# Patient Record
Sex: MALE | Race: WHITE | NOT HISPANIC OR LATINO | Employment: FULL TIME | URBAN - NONMETROPOLITAN AREA
[De-identification: names, ages, dates, MRNs, and addresses within clinical notes are randomized per-mention and may not be internally consistent; named-entity substitution may affect disease eponyms.]

---

## 2021-01-25 ENCOUNTER — OFFICE VISIT (OUTPATIENT)
Dept: UROLOGY | Facility: CLINIC | Age: 66
End: 2021-01-25

## 2021-01-25 VITALS — HEIGHT: 70 IN | WEIGHT: 172 LBS | TEMPERATURE: 96.2 F | BODY MASS INDEX: 24.62 KG/M2

## 2021-01-25 DIAGNOSIS — R97.20 ELEVATED PSA: Primary | ICD-10-CM

## 2021-01-25 DIAGNOSIS — R35.0 FREQUENCY OF URINATION: ICD-10-CM

## 2021-01-25 LAB
BILIRUB BLD-MCNC: NEGATIVE MG/DL
CLARITY, POC: CLEAR
COLOR UR: YELLOW
GLUCOSE UR STRIP-MCNC: NEGATIVE MG/DL
KETONES UR QL: NEGATIVE
LEUKOCYTE EST, POC: NEGATIVE
NITRITE UR-MCNC: NEGATIVE MG/ML
PH UR: 6 [PH] (ref 5–8)
PROT UR STRIP-MCNC: NEGATIVE MG/DL
RBC # UR STRIP: NEGATIVE /UL
SP GR UR: 1.02 (ref 1–1.03)
UROBILINOGEN UR QL: NORMAL

## 2021-01-25 PROCEDURE — 99203 OFFICE O/P NEW LOW 30 MIN: CPT | Performed by: UROLOGY

## 2021-01-25 PROCEDURE — 81003 URINALYSIS AUTO W/O SCOPE: CPT | Performed by: UROLOGY

## 2021-01-25 PROCEDURE — 84153 ASSAY OF PSA TOTAL: CPT | Performed by: UROLOGY

## 2021-01-25 PROCEDURE — 84154 ASSAY OF PSA FREE: CPT | Performed by: UROLOGY

## 2021-01-25 RX ORDER — DIPHENOXYLATE HYDROCHLORIDE AND ATROPINE SULFATE 2.5; .025 MG/1; MG/1
2.5 TABLET ORAL
COMMUNITY
Start: 2020-11-02 | End: 2021-03-02

## 2021-01-25 RX ORDER — ALPRAZOLAM 0.5 MG/1
TABLET ORAL
Qty: 3 TABLET | Refills: 0 | Status: SHIPPED | OUTPATIENT
Start: 2021-01-25 | End: 2021-03-02

## 2021-01-25 RX ORDER — LEVOFLOXACIN 500 MG/1
TABLET, FILM COATED ORAL
Qty: 3 TABLET | Refills: 0 | Status: SHIPPED | OUTPATIENT
Start: 2021-01-25 | End: 2021-03-02

## 2021-01-25 NOTE — PROGRESS NOTES
Chief Complaint:          Elevated psa of 4.3    HPI:   65 y.o. male.  His previous psa's have been normal by history.  Family hx negative for cancer but his father did have a turp.  Pt has no hx of infection.  HPI      Past Medical History:        Past Medical History:   Diagnosis Date   • Diabetes mellitus (CMS/HCC)          Current Meds:     Current Outpatient Medications   Medication Sig Dispense Refill   • diphenoxylate-atropine (LOMOTIL) 2.5-0.025 MG per tablet 2.5 tablets.     • metFORMIN (GLUCOPHAGE) 500 MG tablet 500 mg.       No current facility-administered medications for this visit.         Allergies:      No Known Allergies     Past Surgical History:     History reviewed. No pertinent surgical history.      Social History:     Social History     Socioeconomic History   • Marital status:      Spouse name: Not on file   • Number of children: Not on file   • Years of education: Not on file   • Highest education level: Not on file   Tobacco Use   • Smoking status: Never Smoker   • Smokeless tobacco: Current User     Types: Chew   Substance and Sexual Activity   • Alcohol use: Never     Frequency: Never   • Drug use: Never   • Sexual activity: Defer       Family History:     Family History   Problem Relation Age of Onset   • No Known Problems Father    • No Known Problems Mother        Review of Systems:     Review of Systems   Constitutional: Negative for chills, fatigue and fever.   HENT: Negative for congestion and sinus pressure.    Respiratory: Negative for chest tightness and shortness of breath.    Cardiovascular: Negative for chest pain.   Gastrointestinal: Negative for abdominal pain, constipation, diarrhea, nausea and vomiting.   Endocrine: Negative for heat intolerance.   Genitourinary: Positive for frequency. Negative for dysuria, flank pain, hematuria and urgency.   Musculoskeletal: Negative for back pain and neck pain.   Skin: Negative for rash.   Allergic/Immunologic: Negative for food  allergies.   Neurological: Negative for dizziness and headaches.   Hematological: Bruises/bleeds easily.   Psychiatric/Behavioral: The patient is not nervous/anxious.        IPSS Questionnaire (AUA-7):  Over the past month…    1)  Incomplete Emptying  How often have you had a sensation of not emptying your bladder?  1 - Less than 1 time in 5   2)  Frequency  How often have you had to urinate less than every two hours? 2 - Less than half the time   3)  Intermittency  How often have you found you stopped and started again several times when you urinated?  0 - Not at all   4) Urgency  How often have you found it difficult to postpone urination?  1 - Less than 1 time in 5   5) Weak Stream  How often have you had a weak urinary stream?  2 - Less than half the time   6) Straining  How often have you had to push or strain to begin urination?  1 - Less than 1 time in 5   7) Nocturia  How many times did you typically get up at night to urinate?  0 - None   Total Score:  7       Quality of life due to urinary symptoms:  If you were to spend the rest of your life with your urinary condition the way it is now, how would you feel about that? 2-Mostly Satisfied   Urine Leakage (Incontinence) 0-No Leakage       I have reviewed the follow portions of the patient's history and confirmed they are accurate today:  allergies, current medications, past family history, past medical history, past social history, past surgical history, problem list and ROS  Physical Exam:     Physical Exam  Vitals signs and nursing note reviewed.   HENT:      Head: Normocephalic and atraumatic.      Right Ear: External ear normal.      Left Ear: External ear normal.      Nose: Nose normal.   Eyes:      Conjunctiva/sclera: Conjunctivae normal.      Pupils: Pupils are equal, round, and reactive to light.   Neck:      Musculoskeletal: Normal range of motion and neck supple.      Thyroid: No thyromegaly.   Cardiovascular:      Rate and Rhythm: Normal rate and  "regular rhythm.      Heart sounds: Normal heart sounds. No murmur.   Pulmonary:      Effort: Pulmonary effort is normal. No respiratory distress.      Breath sounds: Normal breath sounds. No wheezing or rales.   Chest:      Chest wall: No tenderness.   Abdominal:      General: Bowel sounds are normal. There is no distension.      Palpations: Abdomen is soft. There is no mass.      Tenderness: There is no abdominal tenderness.      Hernia: No hernia is present.   Genitourinary:     Penis: Normal.       Prostate: Normal.      Rectum: Normal.      Comments: Small nodule about pea size on the left apex.  Musculoskeletal: Normal range of motion.         General: No tenderness.   Lymphadenopathy:      Cervical: No cervical adenopathy.   Skin:     General: Skin is warm.      Findings: No rash.   Neurological:      Mental Status: He is alert and oriented to person, place, and time.      Cranial Nerves: No cranial nerve deficit.      Motor: No abnormal muscle tone.      Coordination: Coordination normal.   Psychiatric:         Behavior: Behavior normal.         Thought Content: Thought content normal.         Judgment: Judgment normal.         Temp 96.2 °F (35.7 °C)   Ht 177.8 cm (70\")   Wt 78 kg (172 lb)   BMI 24.68 kg/m²    Procedure:         Assessment:     Encounter Diagnoses   Name Primary?   • Frequency of urination    • Elevated PSA Yes       Orders Placed This Encounter   Procedures   • PSA, Total & Free   • POC Urinalysis Dipstick, Automated       Patient reports that he is not currently experiencing any symptoms of urinary incontinence.      Plan:   Pt has elevated psa with Keller nodule on left apex.  Will proceed with prostate ultrasound and biopsies but I would like to check a free and total psa today even though we are going to proceed with biopsies.       Patient's Body mass index is 24.68 kg/m². BMI is within normal parameters. No follow-up required..      Smoking Cessation Counseling:  Never a " smoker.  Patient does not currently use any tobacco products.     Counseling was given to patient for the following topics impressions as follows: elevated psa and prostate nodule. The interim medical history and current results were reviewed.  A treatment plan with follow-up was made for Elevated PSA [R97.20].   This document has been electronically signed by Tarik Arteaga MD January 25, 2021 09:05 EST

## 2021-01-26 LAB
PSA FREE MFR SERPL: 13.1 %
PSA FREE SERPL-MCNC: 0.63 NG/ML
PSA SERPL-MCNC: 4.8 NG/ML (ref 0–4)

## 2021-02-17 ENCOUNTER — TELEPHONE (OUTPATIENT)
Dept: UROLOGY | Facility: CLINIC | Age: 66
End: 2021-02-17

## 2021-02-17 NOTE — TELEPHONE ENCOUNTER
Tobin was scheduled this morning for a biopsy but we rescheduled it for Friday morning. He had already taken his antibiotic and enema. I told him if he can get here we will give him another enema and we can send another antibiotic to his pharmacy.

## 2021-02-22 ENCOUNTER — PROCEDURE VISIT (OUTPATIENT)
Dept: UROLOGY | Facility: CLINIC | Age: 66
End: 2021-02-22

## 2021-02-22 ENCOUNTER — TELEPHONE (OUTPATIENT)
Dept: UROLOGY | Facility: CLINIC | Age: 66
End: 2021-02-22

## 2021-02-22 VITALS — BODY MASS INDEX: 24.68 KG/M2 | HEIGHT: 70 IN

## 2021-02-22 DIAGNOSIS — R97.20 ELEVATED PSA: Primary | ICD-10-CM

## 2021-02-22 DIAGNOSIS — N40.2 PROSTATE NODULE: ICD-10-CM

## 2021-02-22 DIAGNOSIS — Z48.816 AFTERCARE FOLLOWING SURGERY OF THE GENITOURINARY SYSTEM: ICD-10-CM

## 2021-02-22 PROCEDURE — 96372 THER/PROPH/DIAG INJ SC/IM: CPT | Performed by: UROLOGY

## 2021-02-22 PROCEDURE — 76942 ECHO GUIDE FOR BIOPSY: CPT | Performed by: UROLOGY

## 2021-02-22 PROCEDURE — 55700 PR PROSTATE NEEDLE BIOPSY ANY APPROACH: CPT | Performed by: UROLOGY

## 2021-02-22 RX ORDER — CEFTRIAXONE 1 G/1
1 INJECTION, POWDER, FOR SOLUTION INTRAMUSCULAR; INTRAVENOUS ONCE
Status: DISCONTINUED | OUTPATIENT
Start: 2021-02-22 | End: 2021-03-02

## 2021-02-22 NOTE — PROGRESS NOTES
"Chief Complaint:          Elevated psa of 4.3   Percent free was 13    HPI:   66 y.o. male.  Pt here for prostate ultrasound and biopsies.  He had small nodule on the left apex on exam.  HPI  His previous psa's have been normal by history.  Family hx negative for cancer but his father did have a turp.  Pt has no hx of infection    Past Medical History:        Past Medical History:   Diagnosis Date   • Diabetes mellitus (CMS/HCC)          Current Meds:     Current Outpatient Medications   Medication Sig Dispense Refill   • ALPRAZolam (Xanax) 0.5 MG tablet Bring to office for procedure. Staff will instruct on dose and timing. 3 tablet 0   • diphenoxylate-atropine (LOMOTIL) 2.5-0.025 MG per tablet 2.5 tablets.     • levoFLOXacin (LEVAQUIN) 500 MG tablet Take 1 tablet by mouth daily for three days starting the day prior to procedure. 3 tablet 0   • metFORMIN (GLUCOPHAGE) 500 MG tablet 500 mg.       No current facility-administered medications for this visit.         Allergies:      No Known Allergies     Past Surgical History:     History reviewed. No pertinent surgical history.      Social History:     Social History     Socioeconomic History   • Marital status:      Spouse name: Not on file   • Number of children: Not on file   • Years of education: Not on file   • Highest education level: Not on file   Tobacco Use   • Smoking status: Never Smoker   • Smokeless tobacco: Current User     Types: Chew   Substance and Sexual Activity   • Alcohol use: Never     Frequency: Never   • Drug use: Never   • Sexual activity: Defer       Family History:     Family History   Problem Relation Age of Onset   • No Known Problems Father    • No Known Problems Mother        Review of Systems:       Physical Exam:     Physical Exam    Ht 177.8 cm (70\")   BMI 24.68 kg/m²    Procedure:     Procedure: Transrectal Ultrasound and Guided Biopsies    Position: Fetal/left lateral decubitus    Prostate Ultrasound Guided lidocaine prostate " block    Sedation: Oral xanax    Indications: elevated psa of 4.3 and subtle left apical nodule    Procedures risks and benefits and risk and alternatives were discussed with the patient. Written Consent was obtained prior to procedure.    In patients without penicillin allergies, preoperative Oral antibiotics and pre-procedure rocephin administered.    Procedure details: 8 Mhz biplanar B&K probe was placed in the rectum. Prostate was scanned from the base of the bladder to the apex. Prostate size was measured at 18 cc prosthetic height 30 mm    width 54 mm   length 41mm    Seminal vesicals: normal  Prostate median lobe normal    Patient is to return in 1 week to discuss pathology..      Assessment:     Encounter Diagnoses   Name Primary?   • Elevated PSA Yes   • Prostate nodule       This document has been electronically signed by Tarik Arteaga MD February 22, 2021 08:46 EST

## 2021-02-23 ENCOUNTER — TELEPHONE (OUTPATIENT)
Dept: GASTROENTEROLOGY | Facility: CLINIC | Age: 66
End: 2021-02-23

## 2021-03-02 ENCOUNTER — OFFICE VISIT (OUTPATIENT)
Dept: UROLOGY | Facility: CLINIC | Age: 66
End: 2021-03-02

## 2021-03-02 VITALS — BODY MASS INDEX: 24.48 KG/M2 | TEMPERATURE: 96.5 F | WEIGHT: 171 LBS | HEIGHT: 70 IN

## 2021-03-02 DIAGNOSIS — C61 PROSTATE CANCER (HCC): Primary | ICD-10-CM

## 2021-03-02 PROCEDURE — 99215 OFFICE O/P EST HI 40 MIN: CPT | Performed by: UROLOGY

## 2021-03-02 RX ORDER — ONDANSETRON 4 MG/1
1 TABLET, FILM COATED ORAL AS NEEDED
COMMUNITY
Start: 2021-01-15 | End: 2021-03-02

## 2021-03-02 NOTE — PROGRESS NOTES
Chief Complaint:          Prostate cancer    HPI:   66 y.o. male.  Pt's psa was 4.3 with a small prostate nodule on the left apex. He had one of 6 biopsies positive on the left mid prostate and he had 3 out of 6 biopsies on the right positive for higher grade 4+4 for a score of 8 then the score of 6 on the left.  His stage is T2a and douglas score of 8 with grade of 4.  HPI  Pt's prostate volume was 18 cc.    Past Medical History:        Past Medical History:   Diagnosis Date   • Diabetes mellitus (CMS/[facility])          Current Meds:     Current Outpatient Medications   Medication Sig Dispense Refill   • BISOPROLOL FUMARATE PO Take  by mouth Daily.     • metFORMIN (GLUCOPHAGE) 500 MG tablet 500 mg.       No current facility-administered medications for this visit.         Allergies:      No Known Allergies     Past Surgical History:     History reviewed. No pertinent surgical history.      Social History:     Social History     Socioeconomic History   • Marital status:      Spouse name: Not on file   • Number of children: Not on file   • Years of education: Not on file   • Highest education level: Not on file   Tobacco Use   • Smoking status: Never Smoker   • Smokeless tobacco: Current User     Types: Chew   Substance and Sexual Activity   • Alcohol use: Never     Frequency: Never   • Drug use: Never   • Sexual activity: Defer       Family History:     Family History   Problem Relation Age of Onset   • No Known Problems Father    • No Known Problems Mother        Review of Systems:     Review of Systems   Constitutional: Negative for chills and fever.   HENT: Negative for sinus pressure.    Respiratory: Negative for cough.    Cardiovascular: Negative for leg swelling.   Gastrointestinal: Negative for abdominal pain.   Neurological: Negative for headaches.   Psychiatric/Behavioral: The patient is nervous/anxious.          Physical Exam:     Physical Exam  Vitals signs and nursing note reviewed.   HENT:      Head:  "Normocephalic and atraumatic.      Right Ear: External ear normal.      Left Ear: External ear normal.      Nose: Nose normal.   Eyes:      Conjunctiva/sclera: Conjunctivae normal.      Pupils: Pupils are equal, round, and reactive to light.   Neck:      Musculoskeletal: Normal range of motion and neck supple.      Thyroid: No thyromegaly.   Cardiovascular:      Rate and Rhythm: Normal rate and regular rhythm.      Heart sounds: Normal heart sounds. No murmur.   Pulmonary:      Effort: Pulmonary effort is normal. No respiratory distress.      Breath sounds: Normal breath sounds. No wheezing or rales.   Chest:      Chest wall: No tenderness.   Abdominal:      General: Bowel sounds are normal. There is no distension.      Palpations: Abdomen is soft. There is no mass.      Tenderness: There is no abdominal tenderness.      Hernia: No hernia is present.   Genitourinary:     Penis: Normal.       Prostate: Normal.      Rectum: Normal.      Comments: Nodule left apex  Musculoskeletal: Normal range of motion.         General: No tenderness.   Lymphadenopathy:      Cervical: No cervical adenopathy.   Skin:     General: Skin is warm.      Findings: No rash.   Neurological:      Mental Status: He is alert and oriented to person, place, and time.      Cranial Nerves: No cranial nerve deficit.      Motor: No abnormal muscle tone.      Coordination: Coordination normal.   Psychiatric:         Behavior: Behavior normal.         Thought Content: Thought content normal.         Judgment: Judgment normal.         Temp 96.5 °F (35.8 °C)   Ht 177.8 cm (70\")   Wt 77.6 kg (171 lb)   BMI 24.54 kg/m²    Procedure:         Assessment:     Encounter Diagnosis   Name Primary?   • Prostate cancer (CMS/HCC) Yes       No orders of the defined types were placed in this encounter.      Patient reports that he is not currently experiencing any symptoms of urinary incontinence.      Plan:         I discussed the options of observation vs " treatment in detail  I gave him hand outs for AUA and [employer] and [facility].    Counseling was given to patient for the following topics diagnostic results including: pathology. The interim medical history and current results were reviewed.  A treatment plan with follow-up was made for Prostate cancer (CMS/HCC) [C61]. I spent 59 minutes face to face with Tobin Herman and 90 percentage was spent in counseling.       This document has been electronically signed by Tarik Arteaga MD March 2, 2021 16:29 EST

## 2021-03-23 ENCOUNTER — OFFICE VISIT (OUTPATIENT)
Dept: UROLOGY | Facility: CLINIC | Age: 66
End: 2021-03-23

## 2021-03-23 VITALS — HEIGHT: 70 IN | BODY MASS INDEX: 24.48 KG/M2 | WEIGHT: 171 LBS | TEMPERATURE: 96.9 F

## 2021-03-23 DIAGNOSIS — C61 PROSTATE CANCER (HCC): Primary | ICD-10-CM

## 2021-03-23 DIAGNOSIS — N48.6 PEYRONIE'S DISEASE: ICD-10-CM

## 2021-03-23 DIAGNOSIS — N40.0 BENIGN PROSTATIC HYPERPLASIA WITHOUT LOWER URINARY TRACT SYMPTOMS: ICD-10-CM

## 2021-03-23 PROCEDURE — 99214 OFFICE O/P EST MOD 30 MIN: CPT | Performed by: UROLOGY

## 2021-03-23 RX ORDER — TADALAFIL 5 MG/1
5 TABLET ORAL DAILY PRN
Qty: 30 TABLET | Refills: 6 | Status: SHIPPED | OUTPATIENT
Start: 2021-03-23

## 2021-03-23 NOTE — PROGRESS NOTES
Chief Complaint:          Chief Complaint   Patient presents with   • Prostate Cancer     2 week fu        HPI:   66 y.o. male returns today, his psa was 4.3 with a small prostate nodule on the left apex. He had one of 6 biopsies positive on the left mid prostate and he had 3 out of 6 biopsies on the right positive for higher grade 4+4 for a score of 8 then the score of 6 on the left.  His stage is T2a and douglas score of 8 with grade of 4.  No voiding dysfunction at all he has no other complaints or problems.  He has no burning no discharge no fevers no chills he has known Peyronie's and takes daily Cialis.    Past Medical History:        Past Medical History:   Diagnosis Date   • Diabetes mellitus (CMS/[facility])          Current Meds:     Current Outpatient Medications   Medication Sig Dispense Refill   • BISOPROLOL FUMARATE PO Take  by mouth Daily.     • metFORMIN (GLUCOPHAGE) 500 MG tablet 500 mg.       No current facility-administered medications for this visit.        Allergies:      No Known Allergies     Past Surgical History:     History reviewed. No pertinent surgical history.      Social History:     Social History     Socioeconomic History   • Marital status:      Spouse name: Not on file   • Number of children: Not on file   • Years of education: Not on file   • Highest education level: Not on file   Tobacco Use   • Smoking status: Never Smoker   • Smokeless tobacco: Current User     Types: Chew   Substance and Sexual Activity   • Alcohol use: Never   • Drug use: Never   • Sexual activity: Defer       Family History:     Family History   Problem Relation Age of Onset   • No Known Problems Father    • No Known Problems Mother        Review of Systems:     Review of Systems   Constitutional: Negative.    HENT: Negative.    Eyes: Negative.    Respiratory: Negative.    Cardiovascular: Negative.    Gastrointestinal: Negative.    Endocrine: Negative.    Musculoskeletal: Negative.    Allergic/Immunologic:  Negative.    Neurological: Negative.    Hematological: Negative.    Psychiatric/Behavioral: Negative.        Physical Exam:     Physical Exam  Vitals and nursing note reviewed.   Constitutional:       Appearance: He is well-developed.   HENT:      Head: Normocephalic and atraumatic.   Eyes:      Conjunctiva/sclera: Conjunctivae normal.      Pupils: Pupils are equal, round, and reactive to light.   Cardiovascular:      Rate and Rhythm: Normal rate and regular rhythm.      Heart sounds: Normal heart sounds.   Pulmonary:      Effort: Pulmonary effort is normal.      Breath sounds: Normal breath sounds.   Abdominal:      General: Bowel sounds are normal.      Palpations: Abdomen is soft.   Musculoskeletal:         General: Normal range of motion.      Cervical back: Normal range of motion.   Skin:     General: Skin is warm and dry.   Neurological:      Mental Status: He is alert and oriented to person, place, and time.      Deep Tendon Reflexes: Reflexes are normal and symmetric.   Psychiatric:         Behavior: Behavior normal.         Thought Content: Thought content normal.         Judgment: Judgment normal.         I have reviewed the following portions of the patient's history: allergies, current medications, past family history, past medical history, past social history, past surgical history, problem list and ROS and confirm it's accurate.      Procedure:       Assessment/Plan:   Peyronie's Disease-we discussed the pathophysiology of this at length.  I went ahead and shena a diagram showing the disease status and how it has about an 18% association with a congenital condition called Dupuytren's contracture which is very much an inherited disease but is seen in men at the average age in the 50s and is associated many times with diabetes.  However the vast majority of this disease is related to penile trauma especially in the mid 50s when testosterone levels are dropping and a rockhard erection is not an option  causing bending at the flexion point of the penis.  We discussed treatment options including the use of xiaflex, which is clostridial collagenase causing a dissolution of the plaque.  This is especially useful single bend however we also discussed penile traction devices vacuum pumps and the use of low-dose PDE-5 inhibitors have been effective. We also discussed the significant foreshortening of the penis which is a hallmark of the disease  This is a very difficult situation for him and we will initiate aggressive therapy as his wife is very interested in resuming sexual intercourse.  He also discussed the various antiquated therapies including the use of colchicine,PABA, verapamil, and vitamin E therapy.  Continue daily Cialis sent to [facility].  Prostate cancer:  He returns today status post biopsy for extensive discussion of prostate cancer.  We discussed staging, and grading of the disease.  I described with a Dave system being from a 2-10 scale with the most common low-grade pattern being a Dave 6.  I discussed the staging workup including a total body bone scan and CT scan especially with a PSA is greater than 10.  We discussed the various options at length I discussed a radical retropubic prostatectomy done in the traditional fashion and using the robotic technique.  I then discussed radiation treatment both seed therapy and external beam therapy using Gold fiduciary markers.  We talked about some of the other alternatives such as a cryosurgical ablation at high intensity focused ultrasound as being viable alternatives but not recommended at this time.  He focused on aggressive watchful waiting and explained that currently low literature it's been discovered that a lot of men can actually observe it especially with numerous other comorbidities cannot have problems from the cancer by itself.  Talked about hormonal ablation.  In the side effects of creation of insulin resistance.  Overall, the patient was  given appropriate literature is going to think about it and I'm going to revisit the topic with them after her next office visit  Had a very long discussion about prostate cancer at first he thought he wanted to have surgery but he does not really want to leave town because of his job.  He is a  I recommended some radiation treatments.  We discussed his other problems.  This is reasonable.  BPH: Discussed the pathophysiology of BPH and obstruction.  We discussed the static and dynamic effect effects of BPH as well as using 5 alpha reductase inhibitors versus alpha blockade.  We discussed the indications for transurethral surgery as well.  And/ or other therapeutic options available including all of the newer techniques.  Currently stable            Patient's Body mass index is 24.54 kg/m². BMI is within normal parameters. No follow-up required..              This document has been electronically signed by NANDA PEREZ MD March 23, 2021 07:58 EDT

## 2021-03-25 PROBLEM — N40.0 BENIGN PROSTATIC HYPERPLASIA WITHOUT LOWER URINARY TRACT SYMPTOMS: Status: ACTIVE | Noted: 2021-03-25

## 2021-03-25 PROBLEM — N48.6 PEYRONIE'S DISEASE: Status: ACTIVE | Noted: 2021-03-25

## 2021-03-25 PROBLEM — C61 PROSTATE CANCER (HCC): Status: ACTIVE | Noted: 2021-03-25

## 2021-04-26 ENCOUNTER — OFFICE VISIT (OUTPATIENT)
Dept: UROLOGY | Facility: CLINIC | Age: 66
End: 2021-04-26

## 2021-04-26 VITALS — TEMPERATURE: 98.2 F | BODY MASS INDEX: 25.05 KG/M2 | WEIGHT: 175 LBS | HEIGHT: 70 IN

## 2021-04-26 DIAGNOSIS — C61 PROSTATE CANCER (HCC): Primary | ICD-10-CM

## 2021-04-26 PROCEDURE — 99213 OFFICE O/P EST LOW 20 MIN: CPT | Performed by: UROLOGY

## 2021-04-26 NOTE — PROGRESS NOTES
Chief Complaint:          Chief Complaint   Patient presents with   • Prostate Cancer     Plan of care       HPI:   66 y.o. male 66 y.o. male returns today, his psa was 4.3 with a small prostate nodule on the left apex. He had one of 6 biopsies positive on the left mid prostate and he had 3 out of 6 biopsies on the right positive for higher grade 4+4 for a score of 8 then the score of 6 on the left.  His stage is T2a and douglas score of 8 with grade of 4.  No voiding dysfunction at all he has no other complaints or problems.  He has no burning no discharge no fevers no chills he has known Peyronie's and takes daily Cialis.      Past Medical History:        Past Medical History:   Diagnosis Date   • Benign prostatic hyperplasia without lower urinary tract symptoms 3/25/2021   • Diabetes mellitus (CMS/[facility])    • Prostate cancer (CMS/[facility]) 3/25/2021         Current Meds:     Current Outpatient Medications   Medication Sig Dispense Refill   • BISOPROLOL FUMARATE PO Take  by mouth Daily.     • metFORMIN (GLUCOPHAGE) 500 MG tablet 500 mg.     • tadalafil (Cialis) 5 MG tablet Take 1 tablet by mouth Daily As Needed for Erectile Dysfunction. Take one Daily 30 tablet 6     No current facility-administered medications for this visit.        Allergies:      No Known Allergies     Past Surgical History:     History reviewed. No pertinent surgical history.      Social History:     Social History     Socioeconomic History   • Marital status:      Spouse name: Not on file   • Number of children: Not on file   • Years of education: Not on file   • Highest education level: Not on file   Tobacco Use   • Smoking status: Never Smoker   • Smokeless tobacco: Current User     Types: Chew   Substance and Sexual Activity   • Alcohol use: Never   • Drug use: Never   • Sexual activity: Defer       Family History:     Family History   Problem Relation Age of Onset   • No Known Problems Father    • No Known Problems Mother        Review of  Systems:     Review of Systems   Constitutional: Negative.    HENT: Negative.    Eyes: Negative.    Respiratory: Negative.    Cardiovascular: Negative.    Gastrointestinal: Negative.    Endocrine: Negative.    Musculoskeletal: Negative.    Allergic/Immunologic: Negative.    Neurological: Negative.    Hematological: Negative.    Psychiatric/Behavioral: Negative.        Physical Exam:     Physical Exam  Vitals and nursing note reviewed.   Constitutional:       Appearance: He is well-developed.   HENT:      Head: Normocephalic and atraumatic.   Eyes:      Conjunctiva/sclera: Conjunctivae normal.      Pupils: Pupils are equal, round, and reactive to light.   Cardiovascular:      Rate and Rhythm: Normal rate and regular rhythm.      Heart sounds: Normal heart sounds.   Pulmonary:      Effort: Pulmonary effort is normal.      Breath sounds: Normal breath sounds.   Abdominal:      General: Bowel sounds are normal.      Palpations: Abdomen is soft.   Musculoskeletal:         General: Normal range of motion.      Cervical back: Normal range of motion.   Skin:     General: Skin is warm and dry.   Neurological:      Mental Status: He is alert and oriented to person, place, and time.      Deep Tendon Reflexes: Reflexes are normal and symmetric.   Psychiatric:         Behavior: Behavior normal.         Thought Content: Thought content normal.         Judgment: Judgment normal.         I have reviewed the following portions of the patient's history: allergies, current medications, past family history, past medical history, past social history, past surgical history, problem list and ROS and confirm it's accurate.      Procedure:       Assessment/Plan:   Prostate cancer:  He returns today status post biopsy for extensive discussion of prostate cancer.  We discussed staging, and grading of the disease.  I described with a Dave system being from a 2-10 scale with the most common low-grade pattern being a [place] 6.  I discussed  the staging workup including a total body bone scan and CT scan especially with a PSA is greater than 10.  We discussed the various options at length I discussed a radical retropubic prostatectomy done in the traditional fashion and using the robotic technique.  I then discussed radiation treatment both seed therapy and external beam therapy using Gold fiduciary markers.  We talked about some of the other alternatives such as a cryosurgical ablation at high intensity focused ultrasound as being viable alternatives but not recommended at this time.  He focused on aggressive watchful waiting and explained that currently low literature it's been discovered that a lot of men can actually observe it especially with numerous other comorbidities cannot have problems from the cancer by itself.  Talked about hormonal ablation.  In the side effects of creation of insulin resistance.  Overall, the patient was given appropriate literature is going to think about it and I'm going to revisit the topic with them after her next office visit  I recommended radiation treatment is the primary therapy.  He will have it here when the center opens up in the next several days            Patient's Body mass index is 25.11 kg/m². BMI is above normal parameters. Recommendations include: educational material.              This document has been electronically signed by NANDA PEREZ MD April 26, 2021 14:17 EDT

## 2021-05-14 ENCOUNTER — TELEPHONE (OUTPATIENT)
Dept: UROLOGY | Facility: CLINIC | Age: 66
End: 2021-05-14

## 2021-05-14 NOTE — TELEPHONE ENCOUNTER
Pt called to find out when he would be scheduled for radiation treatment.  He thought Dr. Londono had told him he would be able to do it our hospital sometime in May.  I don't see a referral has been put it for this to be scheduled.  He would like it to be done here but if it can't wait he is willing to be referred to someplace else.

## 2021-05-14 NOTE — TELEPHONE ENCOUNTER
I talked to Dr Funes and we are waiting on our radiation center to open in the hospital it should open shortly he has already talked to the radiation therapist and established a connection on that for the patient. I called the pt with no answer and no way to leave a message please tell him this is being handled and will contact him with dates and times as soon as the center opens

## 2021-06-21 ENCOUNTER — OFFICE VISIT (OUTPATIENT)
Dept: RADIATION ONCOLOGY | Facility: HOSPITAL | Age: 66
End: 2021-06-21

## 2021-06-21 ENCOUNTER — CONSULT (OUTPATIENT)
Dept: RADIATION ONCOLOGY | Facility: HOSPITAL | Age: 66
End: 2021-06-21

## 2021-06-21 VITALS
WEIGHT: 166.5 LBS | TEMPERATURE: 97.3 F | BODY MASS INDEX: 23.84 KG/M2 | DIASTOLIC BLOOD PRESSURE: 87 MMHG | SYSTOLIC BLOOD PRESSURE: 146 MMHG | OXYGEN SATURATION: 95 % | HEIGHT: 70 IN | HEART RATE: 70 BPM | RESPIRATION RATE: 16 BRPM

## 2021-06-21 DIAGNOSIS — C61 PROSTATE CANCER (HCC): Primary | ICD-10-CM

## 2021-06-21 PROCEDURE — G0463 HOSPITAL OUTPT CLINIC VISIT: HCPCS

## 2021-06-21 PROCEDURE — 99204 OFFICE O/P NEW MOD 45 MIN: CPT | Performed by: RADIOLOGY

## 2021-06-21 NOTE — PROGRESS NOTES
Chief Complaint  Prostate Cancer    Subjective          Tobin Herman presents to [facility] RADIATION ONCOLOGY  History of Present Illness     66 y.o. male with high risk prostate cancer with PSA 4.3 with a small prostate nodule on the left apex. He had one of 6 biopsies positive on the left mid prostate and he had 3 out of 6 biopsies on the right positive for higher grade 4+4 for a score of 8 then the score of 6 on the left.  His stage is T2a and douglas score of 8 with grade of 4.        No voiding dysfunction at all he has no other complaints or problems.  He has no burning no discharge no fevers no chills he has known Peyronie's and takes daily Cialis.    Past Medical History:         Medical History        Past Medical History:   Diagnosis Date   • Diabetes mellitus (CMS/[facility])                 Current Meds:      Current Medications          Current Outpatient Medications   Medication Sig Dispense Refill   • ALPRAZolam (Xanax) 0.5 MG tablet Bring to office for procedure. Staff will instruct on dose and timing. 3 tablet 0   • diphenoxylate-atropine (LOMOTIL) 2.5-0.025 MG per tablet 2.5 tablets.       • levoFLOXacin (LEVAQUIN) 500 MG tablet Take 1 tablet by mouth daily for three days starting the day prior to procedure. 3 tablet 0   • metFORMIN (GLUCOPHAGE) 500 MG tablet 500 mg.          No current facility-administered medications for this visit.             Allergies:       No Known Allergies     Past Surgical History:      Surgical History   History reviewed. No pertinent surgical history.           Social History:      Social History   Social History            Socioeconomic History   • Marital status:        Spouse name: Not on file   • Number of children: Not on file   • Years of education: Not on file   • Highest education level: Not on file   Tobacco Use   • Smoking status: Never Smoker   • Smokeless tobacco: Current User       Types: Chew   Substance and Sexual Activity   • Alcohol use:  "Never       Frequency: Never   • Drug use: Never   • Sexual activity: Defer            Family History:            Family History   Problem Relation Age of Onset   • No Known Problems Father     • No Known Problems Mother          Objective   Vital Signs:   /87   Pulse 70   Temp 97.3 °F (36.3 °C) (Temporal)   Resp 16   Ht 177.8 cm (70\")   Wt 75.5 kg (166 lb 8 oz)   SpO2 95%   BMI 23.89 kg/m²     Physical Exam   Result Review :            Pathology from February 23, 2021 showed prostate adenocarcinoma Douglas 84+4 involving 30% of tissue core in the right apex Douglas 884+4 involving 10% of tissue core in the right lateral apex.  Right lateral mid [place] 7, left mid [place] 6.         Assessment and Plan      66 y.o. male with high risk prostate cancer with PSA 4.3 with a small prostate nodule on the left apex. He had one of 6 biopsies positive on the left mid prostate and he had 3 out of 6 biopsies on the right positive for higher grade 4+4 for a score of 8 then the score of 6 on the left.  His stage is T2a and douglas score of 8 with grade of 4.       In summary, this is a generally in excellent health, per the NCCN risk classification he has high risk disease .    Management options, including active surveillance, surgery, and radiation therapy, were   discussed in detail.    In the case of surgery, robot-assisted radical prostatectomy would be required. He would be a candidate for bilateral completenerve sparing. I reviewed the risks and benefits, including bleeding, infection, injury to the  urethra, bladder and surrounding visceral and vascular structures of the abdomen and pelvis,  general risks of surgery and anesthesia, including positioning injuries and possible need to convert to a different operation, and longer-term risks including bladder neck contracture, urinary incontinence, erectile dysfunction, dry ejaculate, infertility, penile shortening, and persistent or recurrent tumor requiring " additional therapy.    In the case of radiation therapy, he would be a candidate for external beam therapy with androgen deprivation therapy together with radiation. I reviewed risks including urinary and/or bowel urgency and frequency, hematuria, hematochezia, diarrhea, fatigue, erectile dysfunction, urethral stricture, rectourinary fistula, and secondary pelvic malignancy. If androgen deprivation were used it would entail additional side effects.     Ultimately, the patient, after a careful discussion of his preferences for relevant outcomes and   concern about adverse effects, the patient has decided on   external-beam radiation.    We will offer a course of external beam radiation therapy with IMRT and IGRT for this patient.  With a 5040 cGy in 180 cGy per fraction to his pelvic lymph nodes, prostate and seminal vesicles, and total dose of 8100 cGy/ 180 cGy per fraction in 9 weeks to his prostate and seminal vesicles.  I have I have explained the risk involving radiation therapy to the patient.    I will coordinate with Dr. Londono for his hormonal therapy care.      Follow Up   Patient was given instructions and counseling regarding his condition or for health maintenance advice. Please see specific information pulled into the AVS if appropriate.

## 2021-06-22 ENCOUNTER — APPOINTMENT (OUTPATIENT)
Dept: RADIATION ONCOLOGY | Facility: HOSPITAL | Age: 66
End: 2021-06-22

## 2021-06-22 PROCEDURE — 77334 RADIATION TREATMENT AID(S): CPT

## 2021-06-23 ENCOUNTER — APPOINTMENT (OUTPATIENT)
Dept: RADIATION ONCOLOGY | Facility: HOSPITAL | Age: 66
End: 2021-06-23

## 2021-06-30 PROCEDURE — 77301 RADIOTHERAPY DOSE PLAN IMRT: CPT

## 2021-06-30 PROCEDURE — 77300 RADIATION THERAPY DOSE PLAN: CPT

## 2021-06-30 PROCEDURE — 77338 DESIGN MLC DEVICE FOR IMRT: CPT

## 2021-07-06 ENCOUNTER — OFFICE VISIT (OUTPATIENT)
Dept: RADIATION ONCOLOGY | Facility: HOSPITAL | Age: 66
End: 2021-07-06

## 2021-07-06 ENCOUNTER — RADIATION ONCOLOGY WEEKLY ASSESSMENT (OUTPATIENT)
Dept: RADIATION ONCOLOGY | Facility: HOSPITAL | Age: 66
End: 2021-07-06

## 2021-07-06 VITALS — WEIGHT: 168.2 LBS | BODY MASS INDEX: 24.13 KG/M2

## 2021-07-06 DIAGNOSIS — C61 PROSTATE CANCER (HCC): Primary | ICD-10-CM

## 2021-07-06 PROCEDURE — 77385: CPT

## 2021-07-06 NOTE — PROGRESS NOTES
OTV Note      Patient Name: Tobin Herman  : 1955   MRN: [id number]     Diagnosis:  No chief complaint on file.    Staging: No matching staging information was found for the patient.     This patient was seen today for an on treatment visit. The patient is receiving radiation treatment to the prostate and SV. The patient has received XRT Dose (cGy): 180 cGy in 1 ractions out of a planned dose of 8100 cGy in 45* fractions.       Subjective      Review of Systems:   Review of Systems   All other systems reviewed and are negative.    Review of Systems   All other systems reviewed and are negative.      Medications:     Current Outpatient Medications:   •  BISOPROLOL FUMARATE PO, Take  by mouth Daily., Disp: , Rfl:   •  metFORMIN (GLUCOPHAGE) 500 MG tablet, 500 mg., Disp: , Rfl:   •  phenazopyridine (PYRIDIUM) 100 MG tablet, Take 1 tablet by mouth 3 (Three) Times a Day As Needed for Bladder Spasms., Disp: 90 tablet, Rfl: 1  •  tadalafil (Cialis) 5 MG tablet, Take 1 tablet by mouth Daily As Needed for Erectile Dysfunction. Take one Daily, Disp: 30 tablet, Rfl: 6  •  tamsulosin (FLOMAX) 0.4 MG capsule 24 hr capsule, Take 1 capsule by mouth Daily., Disp: 30 capsule, Rfl: 2    Allergies:   No Known Allergies      Objective       Vital Signs:   Vitals:    21 1000   Weight: 76.3 kg (168 lb 3.2 oz)     Body mass index is 24.13 kg/m².     Current Total XRT Dose (cGY): XRT Dose (cGy): 180    Plan      Plan:   Patient was seen today for an on treatment visit. Patient is receiving radiation therapy to the prostate and SV. Patient is stable and tolerating radiation therapy well with minimal side effects. Continue with radiation therapy.       I have reviewed treatment setup notes, checked and approved the daily guidance images. I reviewed dose delivery, treatment parameters and deemed them appropriate. We plan to continue radiation therapy as prescribed.       IJoel MD, personally performed the services  described in this documentation as scribed by the above named individual in my presence, and it is both accurate and complete.      Patient tolerating well. No complaints at this time.     Electronically signed by Joel Davila MD, 09/08/21, 2:50 PM EDT.

## 2021-07-07 ENCOUNTER — PRIOR AUTHORIZATION (OUTPATIENT)
Dept: UROLOGY | Facility: CLINIC | Age: 66
End: 2021-07-07

## 2021-07-07 PROCEDURE — 77385: CPT

## 2021-07-07 NOTE — TELEPHONE ENCOUNTER
Kat RIVAS - [phone]    Member ID: [id number]  [place]: 8  Type & Stage: adenocarcinoma of prostate   ECO  Initial or Continuation: Initial    Approval Dates: 2021 til   Authorization: [id number]      Faxed clinical documentation to [phone]

## 2021-07-08 PROCEDURE — 77336 RADIATION PHYSICS CONSULT: CPT | Performed by: RADIOLOGY

## 2021-07-08 PROCEDURE — 77385: CPT | Performed by: RADIOLOGY

## 2021-07-09 ENCOUNTER — OFFICE VISIT (OUTPATIENT)
Dept: UROLOGY | Facility: CLINIC | Age: 66
End: 2021-07-09

## 2021-07-09 VITALS — HEIGHT: 70 IN | WEIGHT: 166.45 LBS | BODY MASS INDEX: 23.83 KG/M2

## 2021-07-09 DIAGNOSIS — C61 PROSTATE CANCER (HCC): Primary | ICD-10-CM

## 2021-07-09 PROCEDURE — 96402 CHEMO HORMON ANTINEOPL SQ/IM: CPT | Performed by: UROLOGY

## 2021-07-09 PROCEDURE — 77385: CPT | Performed by: RADIOLOGY

## 2021-07-09 PROCEDURE — 99213 OFFICE O/P EST LOW 20 MIN: CPT | Performed by: UROLOGY

## 2021-07-09 NOTE — PROGRESS NOTES
Chief Complaint:          Chief Complaint   Patient presents with   • Prostate Cancer       HPI:   66 y.o. male currently in the midst of radiation he is here for Lupron treatment discussed risks and benefits including vasomotor instability etc. given a 6-month Lupron I will see him back in 6 months      Past Medical History:        Past Medical History:   Diagnosis Date   • Benign prostatic hyperplasia without lower urinary tract symptoms 3/25/2021   • Diabetes mellitus (CMS/[facility])    • Prostate cancer (CMS/[facility]) 3/25/2021         Current Meds:     Current Outpatient Medications   Medication Sig Dispense Refill   • BISOPROLOL FUMARATE PO Take  by mouth Daily.     • metFORMIN (GLUCOPHAGE) 500 MG tablet 500 mg.     • tadalafil (Cialis) 5 MG tablet Take 1 tablet by mouth Daily As Needed for Erectile Dysfunction. Take one Daily 30 tablet 6     No current facility-administered medications for this visit.        Allergies:      No Known Allergies     Past Surgical History:     Past Surgical History:   Procedure Laterality Date   • PROSTATE SURGERY           Social History:     Social History     Socioeconomic History   • Marital status:      Spouse name: Not on file   • Number of children: Not on file   • Years of education: Not on file   • Highest education level: Not on file   Tobacco Use   • Smoking status: Never Smoker   • Smokeless tobacco: Current User     Types: Chew   Substance and Sexual Activity   • Alcohol use: Never   • Drug use: Never   • Sexual activity: Defer       Family History:     Family History   Problem Relation Age of Onset   • No Known Problems Father    • Diabetes Mother        Review of Systems:     Review of Systems   Constitutional: Negative.    HENT: Negative.    Eyes: Negative.    Respiratory: Negative.    Cardiovascular: Negative.    Gastrointestinal: Negative.    Endocrine: Negative.    Musculoskeletal: Negative.    Allergic/Immunologic: Negative.    Neurological: Negative.     Hematological: Negative.    Psychiatric/Behavioral: Negative.        Physical Exam:     Physical Exam  Vitals and nursing note reviewed.   Constitutional:       Appearance: He is well-developed.   HENT:      Head: Normocephalic and atraumatic.   Eyes:      Conjunctiva/sclera: Conjunctivae normal.      Pupils: Pupils are equal, round, and reactive to light.   Cardiovascular:      Rate and Rhythm: Normal rate and regular rhythm.      Heart sounds: Normal heart sounds.   Pulmonary:      Effort: Pulmonary effort is normal.      Breath sounds: Normal breath sounds.   Abdominal:      General: Bowel sounds are normal.      Palpations: Abdomen is soft.   Musculoskeletal:         General: Normal range of motion.      Cervical back: Normal range of motion.   Skin:     General: Skin is warm and dry.   Neurological:      Mental Status: He is alert and oriented to person, place, and time.      Deep Tendon Reflexes: Reflexes are normal and symmetric.   Psychiatric:         Behavior: Behavior normal.         Thought Content: Thought content normal.         Judgment: Judgment normal.         I have reviewed the following portions of the patient's history: allergies, current medications, past family history, past medical history, past social history, past surgical history, problem list and ROS and confirm it's accurate.      Procedure:       Assessment/Plan:   Prostate cancer:  He returns today status post biopsy for extensive discussion of prostate cancer.  We discussed staging, and grading of the disease.  I described with a Dave system being from a 2-10 scale with the most common low-grade pattern being a [place] 6.  I discussed the staging workup including a total body bone scan and CT scan especially with a PSA is greater than 10.  We discussed the various options at length I discussed a radical retropubic prostatectomy done in the traditional fashion and using the robotic technique.  I then discussed radiation treatment  both seed therapy and external beam therapy using Gold fiduciary markers.  We talked about some of the other alternatives such as a cryosurgical ablation at high intensity focused ultrasound as being viable alternatives but not recommended at this time.  He focused on aggressive watchful waiting and explained that currently low literature it's been discovered that a lot of men can actually observe it especially with numerous other comorbidities cannot have problems from the cancer by itself.  Talked about hormonal ablation.  In the side effects of creation of insulin resistance.  Overall, the patient was given appropriate literature is going to think about it and I'm going to revisit the topic with them after her next office visit  Given a 6-month Lupron injection as an adjunct to his ongoing radiation therapy protocol I will see him back in 6 months.        Patient's Body mass index is 23.88 kg/m². indicating that he is within normal range (BMI 18.5-24.9). No BMI management plan needed..              This document has been electronically signed by NANDA PEREZ MD July 9, 2021 14:10 EDT

## 2021-07-12 PROCEDURE — 77385: CPT | Performed by: RADIOLOGY

## 2021-07-13 ENCOUNTER — RADIATION ONCOLOGY WEEKLY ASSESSMENT (OUTPATIENT)
Dept: RADIATION ONCOLOGY | Facility: HOSPITAL | Age: 66
End: 2021-07-13

## 2021-07-13 VITALS — BODY MASS INDEX: 24.22 KG/M2 | WEIGHT: 168.8 LBS

## 2021-07-13 DIAGNOSIS — C61 PROSTATE CANCER (HCC): Primary | ICD-10-CM

## 2021-07-13 PROCEDURE — 77385: CPT | Performed by: RADIOLOGY

## 2021-07-14 PROCEDURE — 77385: CPT | Performed by: RADIOLOGY

## 2021-07-15 PROCEDURE — 77385: CPT | Performed by: RADIOLOGY

## 2021-07-15 PROCEDURE — 77336 RADIATION PHYSICS CONSULT: CPT | Performed by: RADIOLOGY

## 2021-07-16 PROCEDURE — 77385: CPT | Performed by: RADIOLOGY

## 2021-07-19 PROCEDURE — 77385: CPT | Performed by: RADIOLOGY

## 2021-07-20 ENCOUNTER — RADIATION ONCOLOGY WEEKLY ASSESSMENT (OUTPATIENT)
Dept: RADIATION ONCOLOGY | Facility: HOSPITAL | Age: 66
End: 2021-07-20

## 2021-07-20 VITALS — WEIGHT: 168.8 LBS | BODY MASS INDEX: 24.22 KG/M2

## 2021-07-20 DIAGNOSIS — C61 PROSTATE CANCER (HCC): Primary | ICD-10-CM

## 2021-07-20 PROCEDURE — 77385: CPT | Performed by: RADIOLOGY

## 2021-07-20 RX ORDER — PHENAZOPYRIDINE HYDROCHLORIDE 100 MG/1
100 TABLET, FILM COATED ORAL 3 TIMES DAILY PRN
Qty: 90 TABLET | Refills: 1 | Status: SHIPPED | OUTPATIENT
Start: 2021-07-20

## 2021-07-20 NOTE — PROGRESS NOTES
Patient tolerating well. Patient complains of burning with urination. Dr. Davila gave patient perscription for pyridium.

## 2021-07-21 PROCEDURE — 77385: CPT | Performed by: RADIOLOGY

## 2021-07-22 PROCEDURE — 77385: CPT | Performed by: RADIOLOGY

## 2021-07-23 PROCEDURE — 77385: CPT | Performed by: RADIOLOGY

## 2021-07-24 PROCEDURE — 77336 RADIATION PHYSICS CONSULT: CPT | Performed by: RADIOLOGY

## 2021-07-26 PROCEDURE — 77385: CPT | Performed by: RADIOLOGY

## 2021-07-27 ENCOUNTER — RADIATION ONCOLOGY WEEKLY ASSESSMENT (OUTPATIENT)
Dept: RADIATION ONCOLOGY | Facility: HOSPITAL | Age: 66
End: 2021-07-27

## 2021-07-27 VITALS — WEIGHT: 166.2 LBS | BODY MASS INDEX: 23.85 KG/M2

## 2021-07-27 DIAGNOSIS — C61 PROSTATE CANCER (HCC): Primary | ICD-10-CM

## 2021-07-27 PROCEDURE — FACE2FACE

## 2021-07-27 PROCEDURE — 77385: CPT | Performed by: RADIOLOGY

## 2021-07-27 PROCEDURE — 77427 RADIATION TX MANAGEMENT X5: CPT | Performed by: RADIOLOGY

## 2021-07-27 NOTE — PROGRESS NOTES
I have seen and examined the patient with my nurse practitioner with my nurse.  Patient is stable tolerating radiation therapy well minimal side effects, will continue radiation therapy.  Continue using pyridium. At 2880cGy.

## 2021-07-28 PROCEDURE — 77385: CPT | Performed by: RADIOLOGY

## 2021-07-29 PROCEDURE — 77385: CPT | Performed by: RADIOLOGY

## 2021-07-30 PROCEDURE — 77336 RADIATION PHYSICS CONSULT: CPT | Performed by: RADIOLOGY

## 2021-07-30 PROCEDURE — 77385: CPT | Performed by: RADIOLOGY

## 2021-08-02 ENCOUNTER — OFFICE VISIT (OUTPATIENT)
Dept: RADIATION ONCOLOGY | Facility: HOSPITAL | Age: 66
End: 2021-08-02

## 2021-08-02 PROCEDURE — 77385: CPT | Performed by: RADIOLOGY

## 2021-08-02 PROCEDURE — 77014: CPT | Performed by: RADIOLOGY

## 2021-08-03 ENCOUNTER — RADIATION ONCOLOGY WEEKLY ASSESSMENT (OUTPATIENT)
Dept: RADIATION ONCOLOGY | Facility: HOSPITAL | Age: 66
End: 2021-08-03

## 2021-08-03 VITALS
RESPIRATION RATE: 16 BRPM | OXYGEN SATURATION: 98 % | SYSTOLIC BLOOD PRESSURE: 155 MMHG | DIASTOLIC BLOOD PRESSURE: 85 MMHG | HEART RATE: 73 BPM | BODY MASS INDEX: 23.7 KG/M2 | TEMPERATURE: 97.7 F | WEIGHT: 165.2 LBS

## 2021-08-03 DIAGNOSIS — C61 PROSTATE CANCER (HCC): Primary | ICD-10-CM

## 2021-08-03 PROCEDURE — 77300 RADIATION THERAPY DOSE PLAN: CPT | Performed by: RADIOLOGY

## 2021-08-03 PROCEDURE — 77014: CPT | Performed by: RADIOLOGY

## 2021-08-03 PROCEDURE — 77427 RADIATION TX MANAGEMENT X5: CPT | Performed by: RADIOLOGY

## 2021-08-03 PROCEDURE — 77338 DESIGN MLC DEVICE FOR IMRT: CPT | Performed by: RADIOLOGY

## 2021-08-03 PROCEDURE — 77385: CPT | Performed by: RADIOLOGY

## 2021-08-03 PROCEDURE — 99214 OFFICE O/P EST MOD 30 MIN: CPT | Performed by: RADIOLOGY

## 2021-08-03 RX ORDER — TAMSULOSIN HYDROCHLORIDE 0.4 MG/1
1 CAPSULE ORAL DAILY
Qty: 30 CAPSULE | Refills: 2 | Status: SHIPPED | OUTPATIENT
Start: 2021-08-03 | End: 2021-10-13 | Stop reason: SDUPTHER

## 2021-08-03 NOTE — PROGRESS NOTES
OTV Note      Patient Name: Tobin Herman  : 1955   MRN: [id number]   20  Diagnosis:  Prostate Cancer     I have seen and examined the patient. The patient is receiving radiation treatment to the prostate. The patient has received 3780 cGy in 20 fractions out of a planned dose of 4500 cGy in 25 fractions. He will receive an additional 20 fraction boost with a total dose of 3600 cGy.       Objective     Physical Exam:  Physical Exam  Constitutional:       General: He is not in acute distress.     Appearance: Normal appearance. He is normal weight. He is not ill-appearing.   HENT:      Head: Normocephalic.      Nose: Nose normal.      Mouth/Throat:      Mouth: Mucous membranes are moist.      Pharynx: Oropharynx is clear.   Eyes:      Extraocular Movements: Extraocular movements intact.      Conjunctiva/sclera: Conjunctivae normal.      Pupils: Pupils are equal, round, and reactive to light.   Pulmonary:      Effort: Pulmonary effort is normal. No respiratory distress.   Abdominal:      General: Abdomen is flat. There is no distension.      Palpations: Abdomen is soft. There is no mass.   Genitourinary:     Penis: Normal.    Musculoskeletal:         General: No swelling or tenderness. Normal range of motion.      Cervical back: Normal range of motion.   Skin:     General: Skin is warm and dry.      Capillary Refill: Capillary refill takes less than 2 seconds.      Findings: No erythema or rash.   Neurological:      General: No focal deficit present.      Mental Status: He is alert and oriented to person, place, and time. Mental status is at baseline.   Psychiatric:         Mood and Affect: Mood normal.         Behavior: Behavior normal.         Thought Content: Thought content normal.         Judgment: Judgment normal.         Vital Signs:   Vitals:    21 0800   BP: 155/85   BP Location: Right arm   Patient Position: Sitting   Pulse: 73   Resp: 16   Temp: 97.7 °F (36.5 °C)   TempSrc: Temporal   SpO2:  98%   Weight: 74.9 kg (165 lb 3.2 oz)     Body mass index is 23.7 kg/m².     Current Total XRT Dose (cGY): XRT Dose (cGy): 3780    Plan      Plan: Patient is stable and tolerating radiation therapy well with minimal side effects. He is complaining of slight burning at the beginning of urination and difficulty maintaining a constant stream during urination. Pt is not on Flomax at this time, this is started today. Pt to receive UA with culture tomorrow after radiation treatment to assess for a UTI. Pt states he has been taking his Pyridium, but he states that he does not think that this is helping. He does state that he has had intermittent diarrhea that has since resolved, but he is aware that this is a side effect of the radiation treatment. No skin irritation noted. Continue with radiation therapy.     I have reviewed treatment setup notes, checked and approved the daily guidance images. I reviewed dose delivery, treatment parameters and deemed them appropriate. We plan to continue radiation therapy as prescribed.     Scribed for Dr. Joel Davila MD by MARCIE Muir    I have supervised the OTV with Felipa and agree with the assessment and plans.    I have reviewed treatment setup notes, checked and approved the daily guidance images. I reviewed dose delivery, treatment parameters and deemed them appropriate. We plan to continue radiation therapy as prescribed.     I, Joel Davila MD, personally performed the services described in this documentation as scribed by the above named individual in my presence, and it is both accurate and complete.    Electronically signed by Joel Davila MD, 09/08/21, 10:50 AM EDT.

## 2021-08-04 DIAGNOSIS — C61 PROSTATE CANCER (HCC): ICD-10-CM

## 2021-08-04 LAB
BILIRUB UR QL STRIP: NEGATIVE
CLARITY UR: CLEAR
COLOR UR: YELLOW
GLUCOSE UR STRIP-MCNC: NEGATIVE MG/DL
HGB UR QL STRIP.AUTO: NEGATIVE
KETONES UR QL STRIP: NEGATIVE
LEUKOCYTE ESTERASE UR QL STRIP.AUTO: NEGATIVE
NITRITE UR QL STRIP: NEGATIVE
PH UR STRIP.AUTO: 6.5 [PH] (ref 5–8)
PROT UR QL STRIP: NEGATIVE
SP GR UR STRIP: 1.01 (ref 1–1.03)
UROBILINOGEN UR QL STRIP: NORMAL

## 2021-08-04 PROCEDURE — 77014: CPT | Performed by: RADIOLOGY

## 2021-08-04 PROCEDURE — 77385: CPT | Performed by: RADIOLOGY

## 2021-08-04 PROCEDURE — 81003 URINALYSIS AUTO W/O SCOPE: CPT

## 2021-08-05 PROCEDURE — 77385: CPT | Performed by: RADIOLOGY

## 2021-08-05 PROCEDURE — 77336 RADIATION PHYSICS CONSULT: CPT | Performed by: RADIOLOGY

## 2021-08-06 PROCEDURE — 77385: CPT | Performed by: RADIOLOGY

## 2021-08-09 PROCEDURE — 77385: CPT | Performed by: RADIOLOGY

## 2021-08-10 ENCOUNTER — OFFICE VISIT (OUTPATIENT)
Dept: RADIATION ONCOLOGY | Facility: HOSPITAL | Age: 66
End: 2021-08-10

## 2021-08-10 VITALS
OXYGEN SATURATION: 99 % | BODY MASS INDEX: 23.88 KG/M2 | TEMPERATURE: 96.9 F | SYSTOLIC BLOOD PRESSURE: 158 MMHG | HEART RATE: 65 BPM | DIASTOLIC BLOOD PRESSURE: 92 MMHG | RESPIRATION RATE: 16 BRPM | WEIGHT: 166.4 LBS

## 2021-08-10 DIAGNOSIS — C61 PROSTATE CANCER (HCC): Primary | ICD-10-CM

## 2021-08-10 PROCEDURE — 77385: CPT | Performed by: RADIOLOGY

## 2021-08-10 NOTE — PROGRESS NOTES
OTV Note      Patient Name: Tobin Herman  : 1955   MRN: [id number]     Diagnosis:  Prostate Cancer     I have seen and examined the patient.     Objective     Physical Exam:  Physical Exam  Vitals and nursing note reviewed.   Constitutional:       Appearance: Normal appearance.   Cardiovascular:      Rate and Rhythm: Normal rate.      Pulses: Normal pulses.   Pulmonary:      Effort: Pulmonary effort is normal.   Abdominal:      General: Abdomen is flat.      Palpations: Abdomen is soft.   Genitourinary:     Penis: Normal.       Testes: Normal.   Skin:     General: Skin is warm and dry.   Neurological:      General: No focal deficit present.      Mental Status: He is alert and oriented to person, place, and time. Mental status is at baseline.   Psychiatric:         Mood and Affect: Mood normal.         Behavior: Behavior normal.         Thought Content: Thought content normal.         Vital Signs:   Vitals:    08/10/21 0800   BP: 158/92   BP Location: Right arm   Patient Position: Sitting   Pulse: 65   Resp: 16   Temp: 96.9 °F (36.1 °C)   TempSrc: Temporal   SpO2: 99%   Weight: 75.5 kg (166 lb 6.4 oz)     Body mass index is 23.88 kg/m².     Current Total XRT Dose (cGY):  4680    Plan      Plan: Patient is stable and tolerating radiation therapy well with minimal side effects. He is complaining today of occasional diarrhea. I instructed him to obtain OTC Imodium if this problem persists. Pt states that his burning during urination has resolved, and that the Flomax has helped with maintaining a constant stream during urination. UA that was obtained at the last visit was clear, with no signs of infection. Continue with radiation therapy.       Patient was seen today in order to maintain continuity of care throughout this week. Dr. Davila out due to medical reasons and was unable to be here for the OTV visits. No billing for OTV’s will be performed for this week. For more detailed information over todays check  in visit see Rad Onc Status Check Flowsheet and note from this day.

## 2021-08-11 PROCEDURE — 77385: CPT | Performed by: RADIOLOGY

## 2021-08-12 PROCEDURE — 77385: CPT | Performed by: RADIOLOGY

## 2021-08-12 PROCEDURE — 77336 RADIATION PHYSICS CONSULT: CPT | Performed by: RADIOLOGY

## 2021-08-13 PROCEDURE — 77385: CPT | Performed by: RADIOLOGY

## 2021-08-16 ENCOUNTER — DOCUMENTATION (OUTPATIENT)
Dept: RADIATION ONCOLOGY | Facility: HOSPITAL | Age: 66
End: 2021-08-16

## 2021-08-16 PROCEDURE — 77385: CPT | Performed by: RADIOLOGY

## 2021-08-17 PROCEDURE — 77385: CPT | Performed by: RADIOLOGY

## 2021-08-18 PROCEDURE — 77385: CPT | Performed by: RADIOLOGY

## 2021-08-19 ENCOUNTER — OFFICE VISIT (OUTPATIENT)
Dept: RADIATION ONCOLOGY | Facility: HOSPITAL | Age: 66
End: 2021-08-19

## 2021-08-19 VITALS
RESPIRATION RATE: 16 BRPM | TEMPERATURE: 97.1 F | BODY MASS INDEX: 23.96 KG/M2 | SYSTOLIC BLOOD PRESSURE: 164 MMHG | WEIGHT: 167 LBS | OXYGEN SATURATION: 99 % | HEART RATE: 69 BPM | DIASTOLIC BLOOD PRESSURE: 89 MMHG

## 2021-08-19 DIAGNOSIS — C61 PROSTATE CANCER (HCC): Primary | ICD-10-CM

## 2021-08-19 PROCEDURE — 77385: CPT | Performed by: RADIOLOGY

## 2021-08-19 NOTE — PROGRESS NOTES
OTV Note      Patient Name: Tobin Herman  : 1955   MRN: [id number]     Diagnosis:  Prostate Cancer     I have seen and examined the patient. No complaints at this time.     Objective     Physical Exam:  Physical Exam  Vitals reviewed.   Constitutional:       Appearance: Normal appearance.   Cardiovascular:      Rate and Rhythm: Normal rate and regular rhythm.      Pulses: Normal pulses.   Pulmonary:      Effort: Pulmonary effort is normal.   Abdominal:      General: Abdomen is flat.      Palpations: Abdomen is soft.   Genitourinary:     Penis: Normal.       Testes: Normal.   Skin:     General: Skin is warm and dry.   Neurological:      General: No focal deficit present.      Mental Status: He is alert and oriented to person, place, and time. Mental status is at baseline.   Psychiatric:         Mood and Affect: Mood normal.         Behavior: Behavior normal.         Thought Content: Thought content normal.         Vital Signs:   Vitals:    21 0700 21 0800   BP: 164/89    BP Location: Left arm    Patient Position: Sitting    Pulse: 69    Resp: 16    Temp: 97.1 °F (36.2 °C)    TempSrc: Temporal    SpO2: 99%    Weight:  75.8 kg (167 lb)     Body mass index is 23.96 kg/m².     Current Total XRT Dose (cGY): XRT Dose (cGy): 5940    Plan      Plan: Patient is stable and tolerating radiation therapy well with minimal side effects. Pt states that his burning during urination has resolved, and that the Flomax has helped with maintaining a constant stream during urination. No further complaints. Continue with radiation therapy.       Patient was seen today with physician from [facility] over FaceTime in order to maintain continuity of care throughout this week. Dr. Davila out due to medical reasons and was unable to be here for the OTV visits. No billing for OTV’s will be performed for this week. For more detailed information over todays check in visit see Rad Onc Status Check Flowsheet and  see providers note in the flowsheet from this day.

## 2021-08-20 PROCEDURE — 77385: CPT | Performed by: RADIOLOGY

## 2021-08-21 PROCEDURE — 77336 RADIATION PHYSICS CONSULT: CPT | Performed by: RADIOLOGY

## 2021-08-23 PROCEDURE — 77014: CPT | Performed by: RADIOLOGY

## 2021-08-23 PROCEDURE — 77385: CPT | Performed by: RADIOLOGY

## 2021-08-24 ENCOUNTER — OFFICE VISIT (OUTPATIENT)
Dept: RADIATION ONCOLOGY | Facility: HOSPITAL | Age: 66
End: 2021-08-24

## 2021-08-24 VITALS
WEIGHT: 165 LBS | RESPIRATION RATE: 18 BRPM | TEMPERATURE: 97 F | SYSTOLIC BLOOD PRESSURE: 156 MMHG | OXYGEN SATURATION: 99 % | BODY MASS INDEX: 23.68 KG/M2 | DIASTOLIC BLOOD PRESSURE: 84 MMHG | HEART RATE: 66 BPM

## 2021-08-24 DIAGNOSIS — C61 PROSTATE CANCER (HCC): Primary | ICD-10-CM

## 2021-08-24 PROCEDURE — 77014: CPT | Performed by: RADIOLOGY

## 2021-08-24 PROCEDURE — 77427 RADIATION TX MANAGEMENT X5: CPT | Performed by: RADIOLOGY

## 2021-08-24 PROCEDURE — 77385: CPT | Performed by: RADIOLOGY

## 2021-08-24 NOTE — PROGRESS NOTES
Patient is stable and tolerating radiation therapy well with minimal side effects. Pt states that his burning during urination has resolved, and that the Flomax has helped with maintaining a constant stream during urination. No further complaints. Continue with radiation therapy.        OTV supervised by , agree with assessment and management.

## 2021-08-25 PROCEDURE — 77014: CPT | Performed by: RADIOLOGY

## 2021-08-25 PROCEDURE — 77385: CPT | Performed by: RADIOLOGY

## 2021-08-26 PROCEDURE — 77014: CPT | Performed by: RADIOLOGY

## 2021-08-26 PROCEDURE — 77336 RADIATION PHYSICS CONSULT: CPT | Performed by: RADIOLOGY

## 2021-08-26 PROCEDURE — 77385: CPT | Performed by: RADIOLOGY

## 2021-08-27 ENCOUNTER — APPOINTMENT (OUTPATIENT)
Dept: RADIATION ONCOLOGY | Facility: HOSPITAL | Age: 66
End: 2021-08-27

## 2021-08-27 PROCEDURE — 77014: CPT | Performed by: RADIOLOGY

## 2021-08-27 PROCEDURE — 77385: CPT | Performed by: RADIOLOGY

## 2021-08-30 PROCEDURE — 77385: CPT | Performed by: RADIOLOGY

## 2021-08-30 PROCEDURE — 77014: CPT | Performed by: RADIOLOGY

## 2021-08-31 ENCOUNTER — RADIATION ONCOLOGY WEEKLY ASSESSMENT (OUTPATIENT)
Dept: RADIATION ONCOLOGY | Facility: HOSPITAL | Age: 66
End: 2021-08-31

## 2021-08-31 VITALS
RESPIRATION RATE: 18 BRPM | WEIGHT: 165.6 LBS | OXYGEN SATURATION: 98 % | DIASTOLIC BLOOD PRESSURE: 82 MMHG | SYSTOLIC BLOOD PRESSURE: 149 MMHG | BODY MASS INDEX: 23.76 KG/M2 | HEART RATE: 65 BPM | TEMPERATURE: 97.1 F

## 2021-08-31 DIAGNOSIS — C61 PROSTATE CANCER (HCC): Primary | ICD-10-CM

## 2021-08-31 PROCEDURE — 77385: CPT | Performed by: RADIOLOGY

## 2021-08-31 PROCEDURE — 77014: CPT | Performed by: RADIOLOGY

## 2021-08-31 PROCEDURE — 77427 RADIATION TX MANAGEMENT X5: CPT | Performed by: RADIOLOGY

## 2021-08-31 NOTE — PROGRESS NOTES
Status Check Note     Patient Name: Tobin Herman  : 1955   MRN: [id number]     Requesting Physician: No ref. provider found    Chief Complaint:  No chief complaint on file.    Pathology: * Cannot find OR log *   Staging: No matching staging information was found for the patient.     History of Present Illness: Tobin Herman is a pleasant 66 y.o. male who is here today for consultation regarding   Prostate cancer  Subjective      Review of Systems:   Review of Systems  Review of Systems - Oncology    I have reviewed and confirmed the accuracy of the ROS as documented by the MA/LPN/RN Joel Davila MD     Past Oncology History:   Oncology/Hematology History    No history exists.        Past Radiation History:  No previous exposures to ionizing radiation therapy and there are not relative contraindications including connective tissue disorder.     Past Medical History:   Past Medical History:   Diagnosis Date   • Benign prostatic hyperplasia without lower urinary tract symptoms 3/25/2021   • Diabetes mellitus (CMS/HCC)    • Prostate cancer (CMS/HCC) 3/25/2021       Past Surgical History:   Past Surgical History:   Procedure Laterality Date   • PROSTATE SURGERY         Family History:   Family History   Problem Relation Age of Onset   • No Known Problems Father    • Diabetes Mother        Social History:   Social History     Socioeconomic History   • Marital status:      Spouse name: Not on file   • Number of children: Not on file   • Years of education: Not on file   • Highest education level: Not on file   Tobacco Use   • Smoking status: Never Smoker   • Smokeless tobacco: Current User     Types: Chew   Substance and Sexual Activity   • Alcohol use: Never   • Drug use: Never   • Sexual activity: Defer       Medications:     Current Outpatient Medications:   •  BISOPROLOL FUMARATE PO, Take  by mouth Daily., Disp: , Rfl:   •  metFORMIN (GLUCOPHAGE) 500 MG tablet, 500 mg., Disp: , Rfl:   •   phenazopyridine (PYRIDIUM) 100 MG tablet, Take 1 tablet by mouth 3 (Three) Times a Day As Needed for Bladder Spasms., Disp: 90 tablet, Rfl: 1  •  tadalafil (Cialis) 5 MG tablet, Take 1 tablet by mouth Daily As Needed for Erectile Dysfunction. Take one Daily, Disp: 30 tablet, Rfl: 6  •  tamsulosin (FLOMAX) 0.4 MG capsule 24 hr capsule, Take 1 capsule by mouth Daily., Disp: 30 capsule, Rfl: 2    Allergies:   No Known Allergies    KPS:100%           Objective     Physical Exam:  Physical Exam   Abd Soft NT ND    Vital Signs: There were no vitals filed for this visit.  There is no height or weight on file to calculate BMI.       Assessment / Plan      Assessment/Plan:   He is seen today for an OTV he is receiving radiationt therapy to the prostate.   16/20 to SV  Total of 4500cGy+2340cGy/8100cGy.    I have reviewed treatment setup notes, checked and approved daily IGRT, I reviewed dose delivery, treatment parameters and deemed them appropriate.  Plan to continue XRT.    Joel Davila MD  [facility] [place]

## 2021-09-01 ENCOUNTER — OFFICE VISIT (OUTPATIENT)
Dept: RADIATION ONCOLOGY | Facility: HOSPITAL | Age: 66
End: 2021-09-01

## 2021-09-01 PROCEDURE — 77385: CPT | Performed by: RADIOLOGY

## 2021-09-02 PROCEDURE — 77385: CPT | Performed by: RADIOLOGY

## 2021-09-02 PROCEDURE — 77336 RADIATION PHYSICS CONSULT: CPT | Performed by: RADIOLOGY

## 2021-09-03 PROCEDURE — 77385: CPT

## 2021-09-07 ENCOUNTER — RADIATION ONCOLOGY WEEKLY ASSESSMENT (OUTPATIENT)
Dept: RADIATION ONCOLOGY | Facility: HOSPITAL | Age: 66
End: 2021-09-07

## 2021-09-07 VITALS
OXYGEN SATURATION: 99 % | DIASTOLIC BLOOD PRESSURE: 93 MMHG | SYSTOLIC BLOOD PRESSURE: 164 MMHG | RESPIRATION RATE: 18 BRPM | BODY MASS INDEX: 24.08 KG/M2 | HEART RATE: 67 BPM | TEMPERATURE: 96.9 F | WEIGHT: 167.8 LBS

## 2021-09-07 DIAGNOSIS — C61 PROSTATE CANCER (HCC): Primary | ICD-10-CM

## 2021-09-07 PROCEDURE — 77385: CPT | Performed by: RADIOLOGY

## 2021-09-07 PROCEDURE — 77427 RADIATION TX MANAGEMENT X5: CPT | Performed by: RADIOLOGY

## 2021-09-07 NOTE — PROGRESS NOTES
OTV Note      Patient Name: Tobin Herman  : 1955   MRN: [id number]     Diagnosis:  Prostate Cancer  Staging:  T2a and douglas score of 8     This patient was seen today for an on treatment visit. The patient is receiving radiation treatment to the prostate and SV. The patient has received  8100 cGy in 35 fractions out of a planned dose of 8100 cGy in 35 fractions. Today was his last treatment.       Subjective      Review of Systems:   Review of Systems   Constitutional: Negative.    HENT: Negative.    Eyes: Negative.    Respiratory: Negative.    Cardiovascular: Negative.    Gastrointestinal: Negative.    Endocrine: Negative.    Genitourinary: Negative.  Negative for difficulty urinating, discharge, dyspareunia, dysuria, frequency, hematuria, nocturia, pelvic pain, penile swelling, urgency and urinary incontinence.   Musculoskeletal: Negative.    Skin: Negative.  Negative for itching, rash and bruise.   Neurological: Negative.    Hematological: Negative.    Psychiatric/Behavioral: Negative.      Review of Systems   Constitutional: Negative.    HENT:  Negative.    Eyes: Negative.    Respiratory: Negative.    Cardiovascular: Negative.    Gastrointestinal: Negative.    Endocrine: Negative.    Genitourinary: Negative.  Negative for bladder incontinence, difficulty urinating, discharge, dyspareunia, dysuria, frequency, hematuria, nocturia, pelvic pain, penile swelling and urgency.    Musculoskeletal: Negative.    Skin: Negative.  Negative for itching, rash and wound.   Neurological: Negative.    Hematological: Negative.    Psychiatric/Behavioral: Negative.        Medications:     Current Outpatient Medications:   •  BISOPROLOL FUMARATE PO, Take  by mouth Daily., Disp: , Rfl:   •  metFORMIN (GLUCOPHAGE) 500 MG tablet, 500 mg., Disp: , Rfl:   •  phenazopyridine (PYRIDIUM) 100 MG tablet, Take 1 tablet by mouth 3 (Three) Times a Day As Needed for Bladder Spasms., Disp: 90 tablet, Rfl: 1  •  tadalafil (Cialis) 5  MG tablet, Take 1 tablet by mouth Daily As Needed for Erectile Dysfunction. Take one Daily, Disp: 30 tablet, Rfl: 6  •  tamsulosin (FLOMAX) 0.4 MG capsule 24 hr capsule, Take 1 capsule by mouth Daily., Disp: 30 capsule, Rfl: 2    Allergies:   No Known Allergies      Objective       Vital Signs:   Vitals:    09/07/21 0800   BP: 164/93   BP Location: Left arm   Patient Position: Sitting   Pulse: 67   Resp: 18   Temp: 96.9 °F (36.1 °C)   TempSrc: Temporal   SpO2: 99%   Weight: 76.1 kg (167 lb 12.8 oz)     Body mass index is 24.08 kg/m².     Current Total XRT Dose (cGY):  8100    Plan      Plan:   Patient was seen today for an on treatment visit. Patient is receiving radiation therapy to the prostate and SV. Patient is stable and has tolerated radiation therapy well with minimal side effects. Today was his last treatment. Patient will come back in 1 month for a F/U.       I have reviewed treatment setup notes, checked and approved the daily guidance images. I reviewed dose delivery, treatment parameters and deemed them appropriate. We plan to continue radiation therapy as prescribed.     I, Joel Davila MD, personally performed the services described in this documentation as scribed by the above named individual in my presence, and it is both accurate and complete.      Scribed for Dr. Joel Davila MD by MARCIE Muir 9/7/2021  08:29 EDT

## 2021-09-10 ENCOUNTER — IMMUNIZATION (OUTPATIENT)
Dept: VACCINE CLINIC | Facility: HOSPITAL | Age: 66
End: 2021-09-10

## 2021-09-10 PROCEDURE — 91300 HC SARSCOV02 VAC 30MCG/0.3ML IM: CPT | Performed by: INTERNAL MEDICINE

## 2021-09-10 PROCEDURE — 0001A: CPT | Performed by: INTERNAL MEDICINE

## 2021-09-22 ENCOUNTER — TRANSCRIBE ORDERS (OUTPATIENT)
Dept: ADMINISTRATIVE | Facility: HOSPITAL | Age: 66
End: 2021-09-22

## 2021-09-22 DIAGNOSIS — Z01.818 OTHER SPECIFIED PRE-OPERATIVE EXAMINATION: Primary | ICD-10-CM

## 2021-09-24 ENCOUNTER — LAB (OUTPATIENT)
Dept: LAB | Facility: HOSPITAL | Age: 66
End: 2021-09-24

## 2021-09-24 DIAGNOSIS — Z01.818 OTHER SPECIFIED PRE-OPERATIVE EXAMINATION: ICD-10-CM

## 2021-09-24 LAB — SARS-COV-2 RNA PNL SPEC NAA+PROBE: NOT DETECTED

## 2021-09-24 PROCEDURE — C9803 HOPD COVID-19 SPEC COLLECT: HCPCS

## 2021-09-24 PROCEDURE — U0004 COV-19 TEST NON-CDC HGH THRU: HCPCS | Performed by: OPHTHALMOLOGY

## 2021-10-01 ENCOUNTER — IMMUNIZATION (OUTPATIENT)
Dept: VACCINE CLINIC | Facility: HOSPITAL | Age: 66
End: 2021-10-01

## 2021-10-01 PROCEDURE — 0002A: CPT | Performed by: INTERNAL MEDICINE

## 2021-10-01 PROCEDURE — 91300 HC SARSCOV02 VAC 30MCG/0.3ML IM: CPT | Performed by: INTERNAL MEDICINE

## 2021-10-06 ENCOUNTER — APPOINTMENT (OUTPATIENT)
Dept: RADIATION ONCOLOGY | Facility: HOSPITAL | Age: 66
End: 2021-10-06

## 2021-10-13 ENCOUNTER — OFFICE VISIT (OUTPATIENT)
Dept: RADIATION ONCOLOGY | Facility: HOSPITAL | Age: 66
End: 2021-10-13

## 2021-10-13 ENCOUNTER — APPOINTMENT (OUTPATIENT)
Dept: RADIATION ONCOLOGY | Facility: HOSPITAL | Age: 66
End: 2021-10-13

## 2021-10-13 VITALS
OXYGEN SATURATION: 98 % | SYSTOLIC BLOOD PRESSURE: 160 MMHG | RESPIRATION RATE: 16 BRPM | DIASTOLIC BLOOD PRESSURE: 87 MMHG | HEART RATE: 86 BPM | TEMPERATURE: 97.5 F

## 2021-10-13 DIAGNOSIS — C61 PROSTATE CANCER (HCC): Primary | ICD-10-CM

## 2021-10-13 PROCEDURE — 99212 OFFICE O/P EST SF 10 MIN: CPT

## 2021-10-13 PROCEDURE — G0463 HOSPITAL OUTPT CLINIC VISIT: HCPCS

## 2021-10-13 RX ORDER — TAMSULOSIN HYDROCHLORIDE 0.4 MG/1
1 CAPSULE ORAL DAILY
Qty: 30 CAPSULE | Refills: 6 | Status: SHIPPED | OUTPATIENT
Start: 2021-10-13

## 2021-10-13 NOTE — PROGRESS NOTES
Office Follow Up Note      Patient Name: Tobin Herman  : 1955   MRN: [id number]     Requesting Physician: Jevon Londono,*    Chief Complaint:  Prostate Cancer  Staging: T2a - douglas score of 8     History of Present Illness: Tobin Herman is a pleasant 66 y.o. male who is here today for follow up after completing radiation therapy. He presented with high risk prostate cancer with PSA 4.3 with a small prostate nodule on the left apex. He had one of 6 biopsies positive on the left mid prostate and he had 3 out of 6 biopsies on the right positive for higher grade 4+4 for a score of 8 then the score of 6 on the left. His stage is T2a and douglas score of 8 with grade of 4. He has known Peyronie's and takes daily Cialis.    He is now s/p XRT and has received 1 Lupron injection. During XRT he received a total of 8100 cGy in 35 fractions. He tolerated this well with the only complication being difficulty starting urination and maintaining a stream.     Today he has no new complaints. He states that he has occasional diarrhea, and continues to have difficulty maintaining his stream during urination. However, he has not been taking his Flomax daily, only when he believes he needs it. No complaints of dysuria, frequency, pain, hematuria, or incontinence.       Subjective      Review of Systems:   Review of Systems   Constitutional: Positive for fatigue.   HENT:  Negative.    Eyes: Negative.    Respiratory: Negative.    Cardiovascular: Negative.    Gastrointestinal: Negative.  Negative for abdominal pain, diarrhea, nausea and vomiting.   Endocrine: Positive for hot flashes.   Genitourinary: Positive for difficulty urinating. Negative for bladder incontinence, dysuria, frequency, hematuria, pelvic pain and penile discharge.    Musculoskeletal: Negative.    Skin: Negative.  Negative for itching and rash.   Neurological: Negative.    Hematological: Negative.    Psychiatric/Behavioral: Negative.        I  have reviewed and confirmed the accuracy of the ROS as documented by the MA/LPN/RN MARCIE Arceo     The following portions of the patient's history were reviewed and updated as appropriate: allergies, current medications, past family history, past medical history, past social history, past surgical history and problem list.    Past Oncology History:   Oncology/Hematology History    No history exists.        KPS: 90%     Results Review:   The following data was reviewed by: MARCIE Arceo on 10/13/2021:  Common labs    Common Labsle 1/25/21   PSA 4.8 (A)   (A) Abnormal value       Comments are available for some flowsheets but are not being displayed.           PSA    PSA 1/25/21   PSA 4.8 (A)   (A) Abnormal value       Comments are available for some flowsheets but are not being displayed.           Covid Tests    Common Labsle 9/24/21   COVID19 Not Detected             Pathology     2/23/2021        Objective     Physical Exam:  Physical Exam  Constitutional:       General: He is not in acute distress.     Appearance: Normal appearance. He is normal weight. He is not ill-appearing.   HENT:      Head: Normocephalic.      Nose: Nose normal.      Mouth/Throat:      Mouth: Mucous membranes are moist.      Pharynx: Oropharynx is clear.   Eyes:      Extraocular Movements: Extraocular movements intact.      Conjunctiva/sclera: Conjunctivae normal.      Pupils: Pupils are equal, round, and reactive to light.   Cardiovascular:      Rate and Rhythm: Normal rate and regular rhythm.      Pulses: Normal pulses.      Heart sounds: Normal heart sounds.   Pulmonary:      Effort: Pulmonary effort is normal. No respiratory distress.      Breath sounds: Normal breath sounds.   Abdominal:      General: Abdomen is flat. Bowel sounds are normal. There is no distension.      Palpations: Abdomen is soft. There is no mass.   Genitourinary:     Pubic Area: No rash.    Musculoskeletal:         General: No swelling  or tenderness. Normal range of motion.      Cervical back: Normal range of motion.   Skin:     General: Skin is warm and dry.      Capillary Refill: Capillary refill takes less than 2 seconds.      Findings: No erythema or rash.   Neurological:      General: No focal deficit present.      Mental Status: He is alert and oriented to person, place, and time. Mental status is at baseline.   Psychiatric:         Mood and Affect: Mood normal.         Behavior: Behavior normal.         Thought Content: Thought content normal.         Judgment: Judgment normal.         Vital Signs:   Vitals:    10/13/21 1300   BP: 160/87   BP Location: Left arm   Patient Position: Sitting   Pulse: 86   Resp: 16   Temp: 97.5 °F (36.4 °C)   TempSrc: Oral   SpO2: 98%     There is no height or weight on file to calculate BMI.       Assessment / Plan      Assessment/Plan:   Tobin Herman is a pleasant 66 y.o. male who is here today for follow up after completing radiation therapy. He presented with high risk prostate cancer with PSA 4.3 with a small prostate nodule on the left apex. He had one of 6 biopsies positive on the left mid prostate and he had 3 out of 6 biopsies on the right positive for higher grade 4+4 for a score of 8 then the score of 6 on the left. His stage is T2a and douglas score of 8 with grade of 4.    We offered this patient a course of external beam radiation therapy with IMRT and IGRT to his pelvic lymph nodes, prostate and seminal vesicles. He completed XRT and received a total of 8100 cGy in 35 fractions.    He did well with treatment. His only complaint today is occasional diarrhea, that is maintained by OTC Imodium. He also continues to have difficulty maintaining his stream during urination. However, he has not been taking his Flomax daily, only when he believes he needs it. Instructed him that he can take this daily and refills were given for this today. His skin looks good today, no erythema or rash. No other  complaints at this time.     He is also s/p 1 Lupron injection and sees Dr. Londono again in December for a 2nd injection.     He will see us again in 6 months.       Follow Up:   F/U in 6 months       MARCIE Arceo  10/13/21 14:45 EDT

## 2021-12-27 ENCOUNTER — OFFICE VISIT (OUTPATIENT)
Dept: UROLOGY | Facility: CLINIC | Age: 66
End: 2021-12-27

## 2021-12-27 VITALS — BODY MASS INDEX: 23.68 KG/M2 | WEIGHT: 165 LBS

## 2021-12-27 DIAGNOSIS — C61 PROSTATE CANCER (HCC): ICD-10-CM

## 2021-12-27 DIAGNOSIS — R97.20 ELEVATED PSA: Primary | ICD-10-CM

## 2021-12-27 PROCEDURE — 99213 OFFICE O/P EST LOW 20 MIN: CPT | Performed by: UROLOGY

## 2021-12-27 PROCEDURE — 84153 ASSAY OF PSA TOTAL: CPT | Performed by: UROLOGY

## 2021-12-28 LAB — PSA SERPL-MCNC: <0.014 NG/ML (ref 0–4)

## 2021-12-30 NOTE — PROGRESS NOTES
Chief Complaint:          Chief Complaint   Patient presents with   • Prostate Cancer     6 month follow up lupron        HPI:   66 y.o. male with prostate cancer.  He is finished his radiation back in January 2021.  We discussed intermittent Lupron he feels terrible, he has a brain fog, his diabetes is greatly exacerbated.  I spoke to him about close observation stopping it which I think is reasonable which is what we are going to do I have a PSA pending I will notify him of the result once available we will see him back in 3 months and monitor closely      Past Medical History:        Past Medical History:   Diagnosis Date   • Benign prostatic hyperplasia without lower urinary tract symptoms 3/25/2021   • Diabetes mellitus (HCC)    • Prostate cancer (HCC) 3/25/2021         Current Meds:     Current Outpatient Medications   Medication Sig Dispense Refill   • BISOPROLOL FUMARATE PO Take  by mouth Daily.     • metFORMIN (GLUCOPHAGE) 500 MG tablet 500 mg.     • phenazopyridine (PYRIDIUM) 100 MG tablet Take 1 tablet by mouth 3 (Three) Times a Day As Needed for Bladder Spasms. 90 tablet 1   • tadalafil (Cialis) 5 MG tablet Take 1 tablet by mouth Daily As Needed for Erectile Dysfunction. Take one Daily 30 tablet 6   • tamsulosin (FLOMAX) 0.4 MG capsule 24 hr capsule Take 1 capsule by mouth Daily. 30 capsule 6     No current facility-administered medications for this visit.        Allergies:      No Known Allergies     Past Surgical History:     Past Surgical History:   Procedure Laterality Date   • PROSTATE SURGERY           Social History:     Social History     Socioeconomic History   • Marital status:    Tobacco Use   • Smoking status: Never Smoker   • Smokeless tobacco: Current User     Types: Chew   Vaping Use   • Vaping Use: Never used   Substance and Sexual Activity   • Alcohol use: Never   • Drug use: Never   • Sexual activity: Defer       Family History:     Family History   Problem Relation Age of Onset    • No Known Problems Father    • Diabetes Mother        Review of Systems:     Review of Systems   Constitutional: Positive for diaphoresis.   HENT: Negative.    Eyes: Negative.    Respiratory: Negative.    Cardiovascular: Negative.    Gastrointestinal: Negative.    Endocrine: Negative.    Musculoskeletal: Negative.    Allergic/Immunologic: Negative.    Neurological: Negative.    Hematological: Negative.    Psychiatric/Behavioral: Negative.        Physical Exam:     Physical Exam  Vitals and nursing note reviewed.   Constitutional:       Appearance: He is well-developed.   HENT:      Head: Normocephalic and atraumatic.   Eyes:      Conjunctiva/sclera: Conjunctivae normal.      Pupils: Pupils are equal, round, and reactive to light.   Cardiovascular:      Rate and Rhythm: Normal rate and regular rhythm.      Heart sounds: Normal heart sounds.   Pulmonary:      Effort: Pulmonary effort is normal.      Breath sounds: Normal breath sounds.   Abdominal:      General: Bowel sounds are normal.      Palpations: Abdomen is soft.   Musculoskeletal:         General: Normal range of motion.      Cervical back: Normal range of motion.   Skin:     General: Skin is warm and dry.   Neurological:      Mental Status: He is alert and oriented to person, place, and time.      Deep Tendon Reflexes: Reflexes are normal and symmetric.   Psychiatric:         Behavior: Behavior normal.         Thought Content: Thought content normal.         Judgment: Judgment normal.         I have reviewed the following portions of the patient's history: Allergies, current medications, past family history, past medical history, past social history, past surgical history, problem list, and ROS and confirm it is accurate.      Procedure:       Assessment/Plan:   Prostate cancer:  He returns today status post biopsy for extensive discussion of prostate cancer.  We discussed staging and grading of the disease.  I described with a [place] system being from a  "2 to10 scale with the most common low-grade pattern being a Dave 6.  I discussed the staging workup including a total body bone scan and CT scan especially with a PSA greater than 10.  We discussed the various options at length. I discussed a radical retropubic prostatectomy done in the traditional fashion and using the robotic technique.  I then discussed radiation treatment both seed therapy and external beam therapy using Gold fiduciary markers.  We talked about some of the other alternatives such as a cryosurgical ablation at high intensity focused ultrasound as being viable alternatives but not recommended at this time.  He focused on aggressive watchful waiting and explained that currently low literature it's been discovered that a lot of men can actually observe it especially with numerous other comorbidities cannot have problems from the cancer by itself.  Talked about hormonal ablation and the side effects of creation of insulin resistance.  Overall, the patient was given appropriate literature, is going to think about it, and I'm going to revisit the topic with them after the next office visit.  He status post a course of radiation treatments and last January had been on Lupron.  But he is having substantial vasomotor instability symptomatology, exacerbation of his diabetes and a \"brain fog\".  The PSA is currently pending at the time of this dictation                This document has been electronically signed by NANDA PEREZ MD December 30, 2021 07:07 EST  "

## 2022-03-28 ENCOUNTER — OFFICE VISIT (OUTPATIENT)
Dept: UROLOGY | Facility: CLINIC | Age: 67
End: 2022-03-28

## 2022-03-28 VITALS — BODY MASS INDEX: 23.66 KG/M2 | WEIGHT: 164.9 LBS

## 2022-03-28 DIAGNOSIS — R97.20 ELEVATED PSA: Primary | ICD-10-CM

## 2022-03-28 DIAGNOSIS — C61 PROSTATE CANCER: ICD-10-CM

## 2022-03-28 PROCEDURE — 36415 COLL VENOUS BLD VENIPUNCTURE: CPT | Performed by: UROLOGY

## 2022-03-28 PROCEDURE — 99213 OFFICE O/P EST LOW 20 MIN: CPT | Performed by: UROLOGY

## 2022-03-29 LAB — PSA SERPL-MCNC: <0.014 NG/ML (ref 0–4)

## 2022-04-13 ENCOUNTER — OFFICE VISIT (OUTPATIENT)
Dept: RADIATION ONCOLOGY | Facility: HOSPITAL | Age: 67
End: 2022-04-13

## 2022-04-13 VITALS
HEART RATE: 94 BPM | DIASTOLIC BLOOD PRESSURE: 87 MMHG | SYSTOLIC BLOOD PRESSURE: 137 MMHG | TEMPERATURE: 98.2 F | RESPIRATION RATE: 18 BRPM | OXYGEN SATURATION: 97 %

## 2022-04-13 DIAGNOSIS — C61 PROSTATE CANCER: ICD-10-CM

## 2022-04-13 PROCEDURE — 99212 OFFICE O/P EST SF 10 MIN: CPT

## 2022-04-13 PROCEDURE — G0463 HOSPITAL OUTPT CLINIC VISIT: HCPCS

## 2022-04-13 NOTE — PROGRESS NOTES
Office Follow Up Note      Patient Name: Tobin Herman  : 1955   MRN: [id number]     Requesting Physician: Jevon Londono,*    Chief Complaint:  Prostate Cancer   Staging: T2a, G8, grade 4    History of Present Illness: Tobin Herman is a pleasant 67 y.o. male who is here today for follow up after completing radiation therapy.    He was initially diagnosed by Dr. Arteaga in  after a biopsy on 2021 revealed prostatic adenocarcinoma. He presented to Dr. Londono with high risk prostate cancer with PSA 4.3 with a small prostate nodule on the left apex. He had one of 6 biopsies positive on the left mid prostate and he had 3 out of 6 biopsies on the right positive for higher grade 4+4 for a score of 8 then the score of 6 on the left. His stage is T2a and douglas score of 8 with grade of 4. He has known Peyronie's and takes daily Cialis.    Interval History:  He is now s/p XRT and has received 1 Lupron injection. During XRT he received a total of 8100 cGy in 35 fractions. He tolerated this well with the only complication being difficulty starting urination and maintaining a stream.  He started his treatments on 2021 and completed them on 2021.  He is now around 7 months out from his last treatment.  At the patient's 1 month follow-up he had occasional diarrhea, and continued to have difficulty maintaining his stream during urination.  During radiation we started him on Flomax and he has been continuing this, but he stated he only took it when he felt like he needed it.  At that time he had no complaints of dysuria, frequency, pain, hematuria, or incontinence.  At his last appointment with Dr. Londono they spoke about continuing hormonal therapy and the patient decided against continuing at this time.  His last PSA as of 3/28/2022 was less than 0.014.  Patient is doing well today.    Subjective      Review of Systems:   Review of Systems   Constitutional: Negative.    HENT:  Negative.     Eyes: Negative.    Respiratory: Negative.    Cardiovascular: Negative.    Gastrointestinal: Negative.  Negative for diarrhea.   Endocrine: Negative.    Genitourinary: Positive for urgency (occasional which sometimes causes incontinence). Negative for decreased urine volume, difficulty urinating, dysuria, frequency, hematuria and nocturia.    Musculoskeletal: Negative.    Skin: Negative.  Negative for color change.   Neurological: Negative.    Hematological: Negative.    Psychiatric/Behavioral: Negative.      Review of Systems   Constitutional: Negative.    HENT: Negative.    Eyes: Negative.    Respiratory: Negative.    Cardiovascular: Negative.    Gastrointestinal: Negative.  Negative for diarrhea.   Endocrine: Negative.    Genitourinary: Positive for urgency (occasional which sometimes causes incontinence). Negative for decreased urine volume, difficulty urinating, dysuria, frequency, hematuria and nocturia.   Musculoskeletal: Negative.    Skin: Negative.  Negative for color change.   Neurological: Negative.    Hematological: Negative.    Psychiatric/Behavioral: Negative.        I have reviewed and confirmed the accuracy of the ROS as documented by the MA/LPN/RN MARCIE Arceo     The following portions of the patient's history were reviewed and updated as appropriate: allergies, current medications, past family history, past medical history, past social history, past surgical history and problem list.    Past Oncology History:   Oncology/Hematology History   Prostate cancer (HCC)   3/25/2021 Initial Diagnosis    Prostate cancer (HCC)     7/6/2021 - 9/7/2021 Radiation    Radiation OncologyTreatment Course:  Tobin Herman received 8100 cGy in 45 fractions to prostate/SV via External Beam Radiation - EBRT.          PHQ-9 Depression Screening  Little interest or pleasure in doing things?     Feeling down, depressed, or hopeless?     Trouble falling or staying asleep, or sleeping too much?     Feeling  tired or having little energy?     Poor appetite or overeating?     Feeling bad about yourself - or that you are a failure or have let yourself or your family down?     Trouble concentrating on things, such as reading the newspaper or watching television?     Moving or speaking so slowly that other people could have noticed? Or the opposite - being so fidgety or restless that you have been moving around a lot more than usual?     Thoughts that you would be better off dead, or of hurting yourself in some way?     PHQ-9 Total Score     If you checked off any problems, how difficult have these problems made it for you to do your work, take care of things at home, or get along with other people?          KPS: 90%     Results Review:   The following data was reviewed by: MARCIE Arceo on 04/13/2022:  Common labs    Common Labsle 12/27/21 3/28/22   PSA <0.014 <0.014      Comments are available for some flowsheets but are not being displayed.           PSA    PSA 12/27/21 3/28/22   PSA <0.014 <0.014      Comments are available for some flowsheets but are not being displayed.             Imaging:   No radiology results for the last 90 days.     Pathology:  2/23/2021        Objective     Physical Exam:  Physical Exam  Vitals reviewed.   Constitutional:       Appearance: Normal appearance.   Cardiovascular:      Rate and Rhythm: Normal rate and regular rhythm.      Pulses: Normal pulses.      Heart sounds: Normal heart sounds.   Pulmonary:      Effort: Pulmonary effort is normal.      Breath sounds: Normal breath sounds.   Skin:     General: Skin is warm and dry.   Neurological:      General: No focal deficit present.      Mental Status: He is alert and oriented to person, place, and time. Mental status is at baseline.   Psychiatric:         Mood and Affect: Mood normal.         Behavior: Behavior normal.         Thought Content: Thought content normal.         Vital Signs:   Vitals:    04/13/22 1404   BP: 137/87    Pulse: 94   Resp: 18   Temp: 98.2 °F (36.8 °C)   TempSrc: Temporal   SpO2: 97%   PainSc: 0-No pain     There is no height or weight on file to calculate BMI.       Assessment / Plan      Assessment/Plan:   Tobin Herman is a pleasant 66 y.o. male who is here today for follow up after completing radiation therapy. He presented with high risk prostate cancer with PSA 4.3 with a small prostate nodule on the left apex. He had one of 6 biopsies positive on the left mid prostate and he had 3 out of 6 biopsies on the right positive for higher grade 4+4 for a score of 8 then the score of 6 on the left. His stage is T2a and douglas score of 8 with grade of 4.     We offered this patient a course of external beam radiation therapy with IMRT and IGRT to his pelvic lymph nodes, prostate and seminal vesicles. He completed XRT and received a total of 8100 cGy in 35 fractions. He tolerated this well with the only complication being difficulty starting urination and maintaining a stream.  He started his treatments on 7/6/2021 and completed them on 9/7/2021.  He is now around 7 months out from his last treatment.      At the patient's 1 month follow-up he had occasional diarrhea that was being controlled with Imodium OTC, and he continued to have difficulty maintaining his stream during urination.  During radiation we started him on Flomax and he has been continuing this, but he stated he only took it when he felt like he needed it.  Instructed him at that visit that he can take this daily and refills were given. At that time he had no complaints of dysuria, frequency, pain, hematuria, or incontinence.  His skin looked good with no erythema or rash.    At his last appointment with Dr. Londono they spoke about continuing hormonal therapy and the patient decided against continuing at this time.  His last PSA as of 3/28/2022 was less than 0.014.      Today the patient is doing well.  He continues to take Flomax only as needed.  He  does have issues with urgency which sometimes leads to incontinence with urination if I restroom is not readily available.  He states he does drink lots of water.  No other issues with urination.  No dysuria, pain, hematuria, or nocturia.  No diarrhea.  He states he does not need Flomax refills today, instructed him to call if he needs some in the future.      Survivorship visit completed today and packet was provided and explained in detail.   He sees Dr. Londono again and about 3 months.  We will see him again in 6 months, sooner if needed.  Continue PSA's with Dr. Londono.    Follow Up:   Return in about 6 months (around 10/13/2022) for Office Visit.    Jodee Carey, MARCIE  04/13/22 14:23 EDT

## 2022-06-28 ENCOUNTER — OFFICE VISIT (OUTPATIENT)
Dept: UROLOGY | Facility: CLINIC | Age: 67
End: 2022-06-28

## 2022-06-28 VITALS — WEIGHT: 164 LBS | HEIGHT: 70 IN | BODY MASS INDEX: 23.48 KG/M2

## 2022-06-28 DIAGNOSIS — C61 PROSTATE CANCER: ICD-10-CM

## 2022-06-28 DIAGNOSIS — N42.9 DISORDER OF PROSTATE: Primary | ICD-10-CM

## 2022-06-28 PROCEDURE — 99213 OFFICE O/P EST LOW 20 MIN: CPT | Performed by: UROLOGY

## 2022-06-28 PROCEDURE — 84153 ASSAY OF PSA TOTAL: CPT | Performed by: UROLOGY

## 2022-06-29 LAB — PSA SERPL-MCNC: <0.1 NG/ML (ref 0–4)

## 2022-10-20 ENCOUNTER — OFFICE VISIT (OUTPATIENT)
Dept: RADIATION ONCOLOGY | Facility: HOSPITAL | Age: 67
End: 2022-10-20

## 2022-10-20 ENCOUNTER — APPOINTMENT (OUTPATIENT)
Dept: RADIATION ONCOLOGY | Facility: HOSPITAL | Age: 67
End: 2022-10-20

## 2022-10-20 VITALS
HEART RATE: 69 BPM | TEMPERATURE: 97.3 F | RESPIRATION RATE: 16 BRPM | OXYGEN SATURATION: 98 % | DIASTOLIC BLOOD PRESSURE: 83 MMHG | SYSTOLIC BLOOD PRESSURE: 136 MMHG

## 2022-10-20 DIAGNOSIS — C61 PROSTATE CANCER: Primary | ICD-10-CM

## 2022-10-20 PROCEDURE — 99214 OFFICE O/P EST MOD 30 MIN: CPT

## 2022-10-20 PROCEDURE — G0463 HOSPITAL OUTPT CLINIC VISIT: HCPCS

## 2022-10-20 NOTE — PROGRESS NOTES
Office Follow Up Note      Patient Name: Tobin Herman  : 1955   MRN: [id number]     Requesting Physician: Tono Rodrigues MD    Chief Complaint:  Prostate Cancer   Staging: T2a, G8, grade 4    History of Present Illness: Tobin Herman is a pleasant 67 y.o. male who is here today for follow up after completing radiation therapy.    He was initially diagnosed by Dr. Arteaga in  after a biopsy on 2021 revealed prostatic adenocarcinoma. He presented to Dr. Londono with high risk prostate cancer with PSA 4.3 with a small prostate nodule on the left apex. He had one of 6 biopsies positive on the left mid prostate and he had 3 out of 6 biopsies on the right positive for higher grade 4+4 for a score of 8 then the score of 6 on the left. His stage is T2a and douglas score of 8 with grade of 4. He has known Peyronie's and takes daily Cialis.    Interval History:  He is now s/p XRT and received 1 Lupron injection. During XRT he received a total of 8100 cGy in 35 fractions. He tolerated this well with the only complication being difficulty starting urination and maintaining a stream, which we started him on Flomax and this helped.  He started his treatments on 2021 and completed them on 2021.  The patient discussed with Dr. Londono they spoke about continuing hormonal therapy and the patient decided against continuing the shot at this time.  His last PSA as of 2022 was less than 0.1.  Patient continues to do well today.    Subjective      Review of Systems:   Review of Systems   Constitutional: Negative.  Negative for fever.   HENT:  Negative.    Eyes: Negative.    Respiratory: Negative.    Cardiovascular: Negative.    Gastrointestinal: Negative.  Negative for diarrhea.   Endocrine: Negative.    Genitourinary: Negative for decreased urine volume, difficulty urinating, dysuria, frequency, hematuria and nocturia.    Musculoskeletal: Negative.    Skin: Negative.  Negative for color change.    Neurological: Negative.    Hematological: Negative.    Psychiatric/Behavioral: Negative.      Review of Systems   Constitutional: Negative.  Negative for fever.   HENT: Negative.    Eyes: Negative.    Respiratory: Negative.    Cardiovascular: Negative.    Gastrointestinal: Negative.  Negative for diarrhea.   Endocrine: Negative.    Genitourinary: Negative for decreased urine volume, difficulty urinating, dysuria, frequency, hematuria and nocturia.   Musculoskeletal: Negative.    Skin: Negative.  Negative for color change.   Neurological: Negative.    Hematological: Negative.    Psychiatric/Behavioral: Negative.        I have reviewed and confirmed the accuracy of the ROS as documented by the MA/LPN/RN MARCIE Arceo     The following portions of the patient's history were reviewed and updated as appropriate: allergies, current medications, past family history, past medical history, past social history, past surgical history and problem list.    Past Oncology History:   Oncology/Hematology History   Prostate cancer (HCC)   3/25/2021 Initial Diagnosis    Prostate cancer (HCC)     7/6/2021 - 9/7/2021 Radiation    Radiation OncologyTreatment Course:  Tobin Herman received 8100 cGy in 45 fractions to prostate/SV via External Beam Radiation - EBRT.          PHQ-9 Depression Screening  Little interest or pleasure in doing things?     Feeling down, depressed, or hopeless?     Trouble falling or staying asleep, or sleeping too much?     Feeling tired or having little energy?     Poor appetite or overeating?     Feeling bad about yourself - or that you are a failure or have let yourself or your family down?     Trouble concentrating on things, such as reading the newspaper or watching television?     Moving or speaking so slowly that other people could have noticed? Or the opposite - being so fidgety or restless that you have been moving around a lot more than usual?     Thoughts that you would be better off  dead, or of hurting yourself in some way?     PHQ-9 Total Score     If you checked off any problems, how difficult have these problems made it for you to do your work, take care of things at home, or get along with other people?          KPS: 90%     Results Review:   The following data was reviewed by: MARCIE Arceo on 04/13/2022:  Common labs    Common Labs 12/27/21 3/28/22 6/28/22   PSA <0.014 <0.014 <0.1      Comments are available for some flowsheets but are not being displayed.           PSA    PSA 12/27/21 3/28/22 6/28/22   PSA <0.014 <0.014 <0.1      Comments are available for some flowsheets but are not being displayed.             Imaging:   No radiology results for the last 90 days.     Pathology:  2/23/2021        Objective     Physical Exam:  Physical Exam  Vitals reviewed.   Constitutional:       Appearance: Normal appearance.   Pulmonary:      Effort: Pulmonary effort is normal.   Skin:     General: Skin is warm and dry.   Neurological:      General: No focal deficit present.      Mental Status: He is alert and oriented to person, place, and time. Mental status is at baseline.   Psychiatric:         Mood and Affect: Mood normal.         Behavior: Behavior normal.         Thought Content: Thought content normal.         Vital Signs:   Vitals:    10/20/22 1024   BP: 136/83   Pulse: 69   Resp: 16   Temp: 97.3 °F (36.3 °C)   TempSrc: Temporal   SpO2: 98%   PainSc: 0-No pain     There is no height or weight on file to calculate BMI.       Assessment / Plan      Assessment/Plan:   Tobin Herman is a pleasant 66 y.o. male who is here today for follow up after completing radiation therapy. He presented with high risk prostate cancer with PSA 4.3 with a small prostate nodule on the left apex. He had one of 6 biopsies positive on the left mid prostate and he had 3 out of 6 biopsies on the right positive for higher grade 4+4 for a score of 8 then the score of 6 on the left. His stage is T2a and  douglas score of 8 with grade of 4.     He is now s/p XRT and received 1 Lupron injection. During XRT he received a total of 8100 cGy in 35 fractions. He tolerated this well with the only complication being difficulty starting urination and maintaining a stream, which we started him on Flomax and this helped.  He started his treatments on 7/6/2021 and completed them on 9/7/2021.  The patient discussed with Dr. Londono they spoke about continuing hormonal therapy and the patient decided against continuing the shot at this time.  His last PSA as of 6/28/2022 was less than 0.1.     Today the patient continues to do well.  He continues to take Flomax, but only as needed.  He reports that he has no lingering symptoms from XRT.  He does have difficulty starting his stream at times which is when he uses the Flomax.  No dysuria, pain, hematuria, or nocturia.  No diarrhea.  He states he does not need Flomax refills today, instructed him to call if he needs some in the future.  Instructed him that if any new symptoms arise or he has any questions do not hesitate to call our office.    He sees Dr. Londono again in December, at this time the patient will be ready to have his next PSA reading.  We will see him again in 6 months, sooner if needed.      Follow Up:   Return in about 6 months (around 4/20/2023) for Office Visit.    MARCIE Arceo  10/20/22 10:57 EDT

## 2023-01-10 ENCOUNTER — OFFICE VISIT (OUTPATIENT)
Dept: UROLOGY | Facility: CLINIC | Age: 68
End: 2023-01-10
Payer: MEDICARE

## 2023-01-10 VITALS — BODY MASS INDEX: 23.48 KG/M2 | WEIGHT: 164 LBS | HEIGHT: 70 IN

## 2023-01-10 DIAGNOSIS — N42.9 DISORDER OF PROSTATE: Primary | ICD-10-CM

## 2023-01-10 DIAGNOSIS — C61 PROSTATE CANCER: ICD-10-CM

## 2023-01-10 LAB — PSA SERPL-MCNC: 0.17 NG/ML (ref 0–4)

## 2023-01-10 PROCEDURE — 84153 ASSAY OF PSA TOTAL: CPT | Performed by: UROLOGY

## 2023-01-10 PROCEDURE — 36415 COLL VENOUS BLD VENIPUNCTURE: CPT | Performed by: UROLOGY

## 2023-01-10 PROCEDURE — 99213 OFFICE O/P EST LOW 20 MIN: CPT | Performed by: UROLOGY

## 2023-01-10 NOTE — PROGRESS NOTES
Chief Complaint:      Chief Complaint   Patient presents with   • Disorder of prostate       HPI:   67 y.o. male returns today.  He has a history of prostate cancer here for 6-month follow-up.  Saw Dr. Tarik Arteaga in October 2021.  Had radiation treatments has no he reports no lower urinary tract symptomatology, particularly irritative symptoms such as frequency, urgency, dysuria, and obstructive symptomatology, particularly dribbling, hesitancy, and intermittency.  Overall doing well PSA was less than 0.1 no constitutional symptomatology    Past Medical History:     Past Medical History:   Diagnosis Date   • Benign prostatic hyperplasia without lower urinary tract symptoms 3/25/2021   • Diabetes mellitus (HCC)    • Prostate cancer (HCC) 3/25/2021       Current Meds:     Current Outpatient Medications   Medication Sig Dispense Refill   • BISOPROLOL FUMARATE PO Take  by mouth Daily.     • metFORMIN (GLUCOPHAGE) 500 MG tablet 500 mg.     • phenazopyridine (PYRIDIUM) 100 MG tablet Take 1 tablet by mouth 3 (Three) Times a Day As Needed for Bladder Spasms. 90 tablet 1   • tadalafil (Cialis) 5 MG tablet Take 1 tablet by mouth Daily As Needed for Erectile Dysfunction. Take one Daily 30 tablet 6   • tamsulosin (FLOMAX) 0.4 MG capsule 24 hr capsule Take 1 capsule by mouth Daily. 30 capsule 6     No current facility-administered medications for this visit.        Allergies:      No Known Allergies     Past Surgical History:     Past Surgical History:   Procedure Laterality Date   • PROSTATE SURGERY         Social History:     Social History     Socioeconomic History   • Marital status:    Tobacco Use   • Smoking status: Never   • Smokeless tobacco: Current     Types: Chew   Vaping Use   • Vaping Use: Never used   Substance and Sexual Activity   • Alcohol use: Never   • Drug use: Never   • Sexual activity: Defer       Family History:     Family History   Problem Relation Age of Onset   • No Known Problems Father     • Diabetes Mother        Review of Systems:     Review of Systems   Constitutional: Negative.    HENT: Negative.    Eyes: Negative.    Respiratory: Negative.    Cardiovascular: Negative.    Gastrointestinal: Negative.    Endocrine: Negative.    Musculoskeletal: Negative.    Allergic/Immunologic: Negative.    Neurological: Negative.    Hematological: Negative.    Psychiatric/Behavioral: Negative.        Physical Exam:     Physical Exam  Vitals and nursing note reviewed.   Constitutional:       Appearance: He is well-developed.   HENT:      Head: Normocephalic and atraumatic.   Eyes:      Conjunctiva/sclera: Conjunctivae normal.      Pupils: Pupils are equal, round, and reactive to light.   Cardiovascular:      Rate and Rhythm: Normal rate and regular rhythm.      Heart sounds: Normal heart sounds.   Pulmonary:      Effort: Pulmonary effort is normal.      Breath sounds: Normal breath sounds.   Abdominal:      General: Bowel sounds are normal.      Palpations: Abdomen is soft.   Musculoskeletal:         General: Normal range of motion.      Cervical back: Normal range of motion.   Skin:     General: Skin is warm and dry.   Neurological:      Mental Status: He is alert and oriented to person, place, and time.      Deep Tendon Reflexes: Reflexes are normal and symmetric.   Psychiatric:         Behavior: Behavior normal.         Thought Content: Thought content normal.         Judgment: Judgment normal.         I have reviewed the following portions of the patient's history: Allergies, current medications, past family history, past medical history, past social history, past surgical history, problem list, and ROS and confirm it is accurate.    Recent Image (CT and/or KUB):      CT Abdomen and Pelvis: No results found for this or any previous visit.       CT Stone Protocol: No results found for this or any previous visit.       KUB: No results found for this or any previous visit.       Labs (past 3 months):      No  visits with results within 3 Month(s) from this visit.   Latest known visit with results is:   Office Visit on 06/28/2022   Component Date Value Ref Range Status   • PSA 06/28/2022 <0.1  0.0 - 4.0 ng/mL Final    Roche ECLIA methodology.  According to the American [employer] Association, Serum PSA should  decrease and remain at undetectable levels after radical  prostatectomy. The AUA defines biochemical recurrence as an initial  PSA value 0.2 ng/mL or greater followed by a subsequent confirmatory  PSA value 0.2 ng/mL or greater.  Values obtained with different assay methods or kits cannot be used  interchangeably. Results cannot be interpreted as absolute evidence  of the presence or absence of malignant disease.        Procedure:       Assessment/Plan:   Prostate cancer:  He returns today status post biopsy for extensive discussion of prostate cancer.  We discussed staging and grading of the disease.  I described with a Dave system being from a 2 to10 scale with the most common low-grade pattern being a [place] 6.  I discussed the staging workup including a total body bone scan and CT scan especially with a PSA greater than 10.  We discussed the various options at length. I discussed a radical retropubic prostatectomy done in the traditional fashion and using the robotic technique.  I then discussed radiation treatment both seed therapy and external beam therapy using Gold fiduciary markers.  We talked about some of the other alternatives such as a cryosurgical ablation at high intensity focused ultrasound as being viable alternatives but not recommended at this time.  He focused on aggressive watchful waiting and explained that currently low literature it's been discovered that a lot of men can actually observe it especially with numerous other comorbidities cannot have problems from the cancer by itself.  Talked about hormonal ablation and the side effects of creation of insulin resistance.  Overall, the  patient was given appropriate literature, is going to think about it, and I'm going to revisit the topic with them after the next office visit.  Remains in remission with no symptomatology status post external beam radiotherapy            This document has been electronically signed by NANDA PEREZ MD January 10, 2023 07:56 EST    Dictated Utilizing Dragon Dictation: Part of this note may be an electronic transcription/translation of spoken language to printed text using the Dragon Dictation System.

## 2023-04-03 ENCOUNTER — TRANSCRIBE ORDERS (OUTPATIENT)
Dept: ADMINISTRATIVE | Facility: HOSPITAL | Age: 68
End: 2023-04-03
Payer: MEDICARE

## 2023-04-03 DIAGNOSIS — M54.10 BACK PAIN WITH RADICULOPATHY: Primary | ICD-10-CM

## 2023-04-20 ENCOUNTER — OFFICE VISIT (OUTPATIENT)
Dept: RADIATION ONCOLOGY | Facility: HOSPITAL | Age: 68
End: 2023-04-20
Payer: MEDICARE

## 2023-04-20 VITALS
OXYGEN SATURATION: 98 % | HEART RATE: 64 BPM | BODY MASS INDEX: 23.53 KG/M2 | TEMPERATURE: 97.8 F | WEIGHT: 164 LBS | DIASTOLIC BLOOD PRESSURE: 87 MMHG | RESPIRATION RATE: 18 BRPM | SYSTOLIC BLOOD PRESSURE: 144 MMHG

## 2023-04-20 DIAGNOSIS — C61 PROSTATE CANCER: ICD-10-CM

## 2023-04-20 PROCEDURE — G0463 HOSPITAL OUTPT CLINIC VISIT: HCPCS | Performed by: RADIOLOGY

## 2023-04-20 NOTE — PROGRESS NOTES
FOLLOW UP NOTE    PATIENT:                                                      Tobin Herman  MEDICAL RECORD #:                        [id number]  :                                                          1955  COMPLETION DATE:    2021    DIAGNOSIS:  Adenocarcinoma of prostate, [place] 8 (4+4) with pretreatment PSA of 4.3 ng/mL Clinical stage cT2 N0 M0.          BRIEF HISTORY:    Mr. Tobin Herman is a 66-year-old gentleman with high risk adenocarcinoma of prostate, Dave score 8 (4+4).  The patient completed a 4500 cGy course of radiotherapy to the prostate/seminal vesicles and pelvic lymph nodes followed by a 3600 cGy boost to the prostate (cumulative dose 8100 cGy) on 2021.  The patient returns today for follow-up evaluation.    Mr. Herman states that he did receive a single Lupron injection before starting radiotherapy.  He is no longer receiving androgen deprivation therapy.    The patient's pretreatment PSA was 4.3 ng/mL.  PSA levels obtained on 2022, 2002, and 01/10/2023 were < 0.014 ng/mL, < 0.1 ng/mL and 0.175 ng/mL, respectively.    Mr. Herman states that he is doing well.  He is pleased with his voiding pattern.  He reports occasional slowness of the urinary stream, intermittency and hesitancy.  He denies urinary frequency, dysuria, hematuria, urgency, and the sensation of incomplete emptying of the bladder.  He denies nocturia.  The patient is taking Flomax on occasion.  The patient has no GI symptoms.  The patient reports right leg discomfort which he attributes to recent trauma from jumping from a ladder.  Patient states that pain at the site is resolving.  He denies other sites of bone discomfort    Medications:   Medication reconciliation for the patient was reviewed and confirmed in the electronic medical record.    Review of Systems - Oncology   Genitourinary symptomatology described above.  Review of systems is otherwise negative.        Physical Exam    VITAL  SIGNS:   Vitals:    04/20/23 0957   BP: 144/87   Pulse: 64   Resp: 18   Temp: 97.8 °F (36.6 °C)   TempSrc: Temporal   SpO2: 98%   Weight: 74.4 kg (164 lb)     The patient is a well-nourished, well-developed fit appearing white male in no acute distress.  Vital signs as above.    Heart: Regular rate and rhythm  Lungs: Clear bilaterally  Abdomen: Soft, nontender no organomegaly.  Normal bowel sounds present.  Pelvis: No radiation changes noted over low pelvis  Rectal exam: Deferred at patient request  Back: Nontender on fist percussion of cervical, thoracic and lumbosacral spines.  No costal tenderness  Neurologic: Cranial nerves intact no motor, sensory, or cerebellar deficit.  Normal gait.  Psychiatric: Normal mood and affect    IMPRESSION:    Clinically stable with PSA of 0.175 ng/mL    RECOMMENDATIONS:    I reviewed post treatment surveillance measures with the patient.  Mr. Herman states that he is seeing Dr. Londono, his urologist twice a year.  His next appointment will be in December 2023.  I have asked the patient to return for recheck by this service in 1 year.    Time spent with patient 20 minutes (the total time included previsit review of medical records, obtaining a separate interval medical history, documentation of findings in the EMR, an appropriate medical examination/evaluation, and patient counseling)    Thank you again for allowing our services participation in Mr. Herman's care.      Barney Lees MD   04/20/2023 10:22

## 2024-01-11 ENCOUNTER — OFFICE VISIT (OUTPATIENT)
Dept: UROLOGY | Facility: CLINIC | Age: 69
End: 2024-01-11
Payer: MEDICARE

## 2024-01-11 VITALS
WEIGHT: 174.2 LBS | SYSTOLIC BLOOD PRESSURE: 165 MMHG | HEIGHT: 70 IN | DIASTOLIC BLOOD PRESSURE: 77 MMHG | BODY MASS INDEX: 24.94 KG/M2 | HEART RATE: 75 BPM

## 2024-01-11 DIAGNOSIS — C61 PROSTATE CANCER: ICD-10-CM

## 2024-01-11 DIAGNOSIS — R97.20 ELEVATED PSA: Primary | ICD-10-CM

## 2024-01-11 LAB — PSA SERPL-MCNC: 0.2 NG/ML (ref 0–4)

## 2024-01-11 PROCEDURE — 84153 ASSAY OF PSA TOTAL: CPT | Performed by: UROLOGY

## 2024-01-11 PROCEDURE — 1159F MED LIST DOCD IN RCRD: CPT | Performed by: UROLOGY

## 2024-01-11 PROCEDURE — 1160F RVW MEDS BY RX/DR IN RCRD: CPT | Performed by: UROLOGY

## 2024-01-11 PROCEDURE — 99213 OFFICE O/P EST LOW 20 MIN: CPT | Performed by: UROLOGY

## 2024-01-11 NOTE — PROGRESS NOTES
Chief Complaint:      Chief Complaint   Patient presents with    Elevated PSA     Follow up        HPI:   Mr. Tobin Herman is a 66-year-old gentleman with high risk adenocarcinoma of prostate, [place] score 8 (4+4).  The patient completed a 4500 cGy course of radiotherapy to the prostate/seminal vesicles and pelvic lymph nodes followed by a 3600 cGy boost to the prostate (cumulative dose 8100 cGy) on 09/07/2021.  The patient returns today for follow-up evaluation.     Mr. Herman states that he did receive a single Lupron injection before starting radiotherapy.  He is no longer receiving androgen deprivation therapy.     The patient's pretreatment PSA was 4.3 ng/mL.  PSA levels obtained on 03/28/2022, 06/28/2002, and 01/10/2023 were < 0.014 ng/mL, < 0.1 ng/mL and 0.175 ng/mL, respectively.     Mr. Herman states that he is doing well.  He is pleased with his voiding pattern.  He reports occasional slowness of the urinary stream, intermittency and hesitancy.  He denies urinary frequency, dysuria, hematuria, urgency, and the sensation of incomplete emptying of the bladder.  He denies nocturia.  The patient is taking Flomax on occasion.  The patient has no GI symptoms.  The patient reports right leg discomfort which he attributes to recent trauma from jumping from a ladder.  Patient states that pain at the site is resolving.  He denies other sites of bone discomfort    Doing well he has no nocturia no rectal irritation does not desire refills PSA is pending I will see him back in a year.     Past Medical History:     Past Medical History:   Diagnosis Date    Benign prostatic hyperplasia without lower urinary tract symptoms 3/25/2021    Diabetes mellitus     Prostate cancer 3/25/2021       Current Meds:     Current Outpatient Medications   Medication Sig Dispense Refill    BISOPROLOL FUMARATE PO Take  by mouth Daily.      metFORMIN (GLUCOPHAGE) 500 MG tablet 1 tablet.      phenazopyridine (PYRIDIUM) 100 MG tablet Take 1 tablet  by mouth 3 (Three) Times a Day As Needed for Bladder Spasms. (Patient not taking: Reported on 7/13/2023) 90 tablet 1    tadalafil (Cialis) 5 MG tablet Take 1 tablet by mouth Daily As Needed for Erectile Dysfunction. Take one Daily 30 tablet 6    tamsulosin (FLOMAX) 0.4 MG capsule 24 hr capsule Take 1 capsule by mouth Daily. 30 capsule 6     No current facility-administered medications for this visit.        Allergies:      No Known Allergies     Past Surgical History:     Past Surgical History:   Procedure Laterality Date    PROSTATE SURGERY         Social History:     Social History     Socioeconomic History    Marital status:    Tobacco Use    Smoking status: Never    Smokeless tobacco: Current     Types: Chew   Vaping Use    Vaping Use: Never used   Substance and Sexual Activity    Alcohol use: Never    Drug use: Never    Sexual activity: Defer       Family History:     Family History   Problem Relation Age of Onset    No Known Problems Father     Diabetes Mother        Review of Systems:     Review of Systems   Constitutional: Negative.    HENT: Negative.     Eyes: Negative.    Respiratory: Negative.     Cardiovascular: Negative.    Gastrointestinal: Negative.    Endocrine: Negative.    Musculoskeletal: Negative.    Allergic/Immunologic: Negative.    Neurological: Negative.    Hematological: Negative.    Psychiatric/Behavioral: Negative.       Physical Exam:     Physical Exam  Vitals and nursing note reviewed.   Constitutional:       Appearance: He is well-developed.   HENT:      Head: Normocephalic and atraumatic.   Eyes:      Conjunctiva/sclera: Conjunctivae normal.      Pupils: Pupils are equal, round, and reactive to light.   Cardiovascular:      Rate and Rhythm: Normal rate and regular rhythm.      Heart sounds: Normal heart sounds.   Pulmonary:      Effort: Pulmonary effort is normal.      Breath sounds: Normal breath sounds.   Abdominal:      General: Bowel sounds are normal.      Palpations:  Abdomen is soft.   Musculoskeletal:         General: Normal range of motion.      Cervical back: Normal range of motion.   Skin:     General: Skin is warm and dry.   Neurological:      Mental Status: He is alert and oriented to person, place, and time.      Deep Tendon Reflexes: Reflexes are normal and symmetric.   Psychiatric:         Behavior: Behavior normal.         Thought Content: Thought content normal.         Judgment: Judgment normal.     I have reviewed the following portions of the patient's history: Allergies, current medications, past family history, past medical history, past social history, past surgical history, problem list, and ROS and confirm it is accurate.    Recent Image (CT and/or KUB):      CT Abdomen and Pelvis: No results found for this or any previous visit.       CT Stone Protocol: No results found for this or any previous visit.       KUB: No results found for this or any previous visit.       Labs (past 3 months):      No visits with results within 3 Month(s) from this visit.   Latest known visit with results is:   Office Visit on 07/13/2023   Component Date Value Ref Range Status    PSA 07/13/2023 0.150  0.000 - 4.000 ng/mL Final        Procedure:       Assessment/Plan:   Prostate cancer he is status post radiation and single dose of Lupron he feels good he is get a great result of a PSA pending he has no rectal irritation or any radiation complications        This document has been electronically signed by NANDA PEREZ MD January 11, 2024 08:01 EST    Dictated Utilizing Dragon Dictation: Part of this note may be an electronic transcription/translation of spoken language to printed text using the Dragon Dictation System.

## 2025-01-14 ENCOUNTER — OFFICE VISIT (OUTPATIENT)
Dept: UROLOGY | Facility: CLINIC | Age: 70
End: 2025-01-14
Payer: MEDICARE

## 2025-01-14 VITALS
SYSTOLIC BLOOD PRESSURE: 154 MMHG | BODY MASS INDEX: 24.2 KG/M2 | DIASTOLIC BLOOD PRESSURE: 83 MMHG | HEIGHT: 70 IN | HEART RATE: 69 BPM | WEIGHT: 169 LBS

## 2025-01-14 DIAGNOSIS — C61 PROSTATE CANCER: ICD-10-CM

## 2025-01-14 DIAGNOSIS — R97.20 ELEVATED PSA: Primary | ICD-10-CM

## 2025-01-14 LAB — PSA SERPL-MCNC: 0.18 NG/ML (ref 0–4)

## 2025-01-14 PROCEDURE — 84153 ASSAY OF PSA TOTAL: CPT | Performed by: UROLOGY

## 2025-01-14 PROCEDURE — 36415 COLL VENOUS BLD VENIPUNCTURE: CPT | Performed by: UROLOGY

## 2025-01-14 PROCEDURE — 1160F RVW MEDS BY RX/DR IN RCRD: CPT | Performed by: UROLOGY

## 2025-01-14 PROCEDURE — 99213 OFFICE O/P EST LOW 20 MIN: CPT | Performed by: UROLOGY

## 2025-01-14 PROCEDURE — 1159F MED LIST DOCD IN RCRD: CPT | Performed by: UROLOGY

## 2025-01-14 RX ORDER — GLIMEPIRIDE 4 MG/1
1 TABLET ORAL EVERY 12 HOURS SCHEDULED
COMMUNITY
Start: 2024-10-10

## 2025-01-14 NOTE — PROGRESS NOTES
Chief Complaint:      Chief Complaint   Patient presents with    Elevated PSA     Yearly       HPI:   69 y.o. male  is a gentleman with high risk adenocarcinoma of prostate, [place] score 8 (4+4).  The patient completed a 4500 cGy course of radiotherapy to the prostate/seminal vesicles and pelvic lymph nodes followed by a 3600 cGy boost to the prostate (cumulative dose 8100 cGy) on 09/07/2021.  The patient returns today for follow-up evaluation.     Mr. Herman states that he did receive a single Lupron injection before starting radiotherapy.  He is no longer receiving androgen deprivation therapy.     The patient's pretreatment PSA was 4.3 ng/mL.  PSA levels obtained on 03/28/2022, 06/28/2002, and 01/10/2023 were < 0.014 ng/mL, < 0.1 ng/mL and 0.175 ng/mL, respectively.     Mr. Herman states that he is doing well.  He is pleased with his voiding pattern.  He reports occasional slowness of the urinary stream, intermittency and hesitancy.  He denies urinary frequency, dysuria, hematuria, urgency, and the sensation of incomplete emptying of the bladder.  He denies nocturia.  The patient is taking Flomax on occasion.  The patient has no GI symptoms.  The patient reports right leg discomfort which he attributes to recent trauma from jumping from a ladder.  Patient states that pain at the site is resolving.  He denies other sites of bone discomfort     Doing well he has no nocturia no rectal irritation does not desire refills PSA is pending I will see him back in a year.    He is doing great no symptoms.  No complaints.  Good stream.  SAS pending    Past Medical History:     Past Medical History:   Diagnosis Date    Benign prostatic hyperplasia without lower urinary tract symptoms 3/25/2021    Diabetes mellitus     Prostate cancer 3/25/2021       Current Meds:     Current Outpatient Medications   Medication Sig Dispense Refill    BISOPROLOL FUMARATE PO Take  by mouth Daily.      metFORMIN (GLUCOPHAGE) 500 MG tablet 1 tablet.       phenazopyridine (PYRIDIUM) 100 MG tablet Take 1 tablet by mouth 3 (Three) Times a Day As Needed for Bladder Spasms. 90 tablet 1    tadalafil (Cialis) 5 MG tablet Take 1 tablet by mouth Daily As Needed for Erectile Dysfunction. Take one Daily 30 tablet 6    tamsulosin (FLOMAX) 0.4 MG capsule 24 hr capsule Take 1 capsule by mouth Daily. 30 capsule 6     No current facility-administered medications for this visit.        Allergies:      No Known Allergies     Past Surgical History:     Past Surgical History:   Procedure Laterality Date    PROSTATE SURGERY         Social History:     Social History     Socioeconomic History    Marital status:    Tobacco Use    Smoking status: Never    Smokeless tobacco: Current     Types: Chew   Vaping Use    Vaping status: Never Used   Substance and Sexual Activity    Alcohol use: Never    Drug use: Never    Sexual activity: Defer       Family History:     Family History   Problem Relation Age of Onset    No Known Problems Father     Diabetes Mother        Review of Systems:     Review of Systems   Constitutional: Negative.    HENT: Negative.     Eyes: Negative.    Respiratory: Negative.     Cardiovascular: Negative.    Gastrointestinal: Negative.    Endocrine: Negative.    Musculoskeletal: Negative.    Allergic/Immunologic: Negative.    Neurological: Negative.    Hematological: Negative.    Psychiatric/Behavioral: Negative.         Physical Exam:     Physical Exam  Vitals and nursing note reviewed.   Constitutional:       Appearance: He is well-developed.   HENT:      Head: Normocephalic and atraumatic.   Eyes:      Conjunctiva/sclera: Conjunctivae normal.      Pupils: Pupils are equal, round, and reactive to light.   Cardiovascular:      Rate and Rhythm: Normal rate and regular rhythm.      Heart sounds: Normal heart sounds.   Pulmonary:      Effort: Pulmonary effort is normal.      Breath sounds: Normal breath sounds.   Abdominal:      General: Bowel sounds are normal.       Palpations: Abdomen is soft.   Musculoskeletal:         General: Normal range of motion.      Cervical back: Normal range of motion.   Skin:     General: Skin is warm and dry.   Neurological:      Mental Status: He is alert and oriented to person, place, and time.      Deep Tendon Reflexes: Reflexes are normal and symmetric.   Psychiatric:         Behavior: Behavior normal.         Thought Content: Thought content normal.         Judgment: Judgment normal.         I have reviewed the following portions of the patient's history: Allergies, current medications, past family history, past medical history, past social history, past surgical history, problem list, and ROS and confirm it is accurate.    Recent Image (CT and/or KUB):      CT Abdomen and Pelvis: No results found for this or any previous visit.       CT Stone Protocol: No results found for this or any previous visit.       KUB: No results found for this or any previous visit.       Labs (past 3 months):      No visits with results within 3 Month(s) from this visit.   Latest known visit with results is:   Office Visit on 01/11/2024   Component Date Value Ref Range Status    PSA 01/11/2024 0.196  0.000 - 4.000 ng/mL Final        Procedure:       Assessment/Plan:   Prostate cancer:  He returns today status post biopsy for extensive discussion of prostate cancer.  We discussed staging and grading of the disease.  I described with a Dave system being from a 2 to10 scale with the most common low-grade pattern being a [place] 6.  I discussed the staging workup including a total body bone scan and CT scan especially with a PSA greater than 10.  We discussed the various options at length. I discussed a radical retropubic prostatectomy done in the traditional fashion and using the robotic technique.  I then discussed radiation treatment both seed therapy and external beam therapy using Gold fiduciary markers.  We talked about some of the other alternatives such as  a cryosurgical ablation at high intensity focused ultrasound as being viable alternatives but not recommended at this time.  He focused on aggressive watchful waiting and explained that currently low literature it's been discovered that a lot of men can actually observe it especially with numerous other comorbidities cannot have problems from the cancer by itself.  Talked about hormonal ablation and the side effects of creation of insulin resistance.  Overall, the patient was given appropriate literature, is going to think about it, and I'm going to revisit the topic with them after the next office visit.  He remains in remission        This document has been electronically signed by NANDA PEREZ MD January 14, 2025 07:53 EST    Dictated Utilizing Dragon Dictation: Part of this note may be an electronic transcription/translation of spoken language to printed text using the Dragon Dictation System.

## 2025-01-14 NOTE — PROGRESS NOTES
Chief Complaint:      Chief Complaint   Patient presents with    Elevated PSA     Yearly       HPI:   Mr. Tobin Herman is a 69-year-old gentleman with high risk adenocarcinoma of prostate, [place] score 8 (4+4).  The patient completed a 4500 cGy course of radiotherapy to the prostate/seminal vesicles and pelvic lymph nodes followed by a 3600 cGy boost to the prostate (cumulative dose 8100 cGy) on 09/07/2021.  The patient returns today for follow-up evaluation.     Mr. Herman states that he did receive a single Lupron injection before starting radiotherapy.  He is no longer receiving androgen deprivation therapy.     The patient's pretreatment PSA was 4.3 ng/mL.  PSA levels obtained on 03/28/2022, 06/28/2002, and 01/10/2023 were < 0.014 ng/mL, < 0.1 ng/mL and 0.175 ng/mL, respectively.     Mr. Herman states that he is doing well.  He is pleased with his voiding pattern.  He reports occasional slowness of the urinary stream, intermittency and hesitancy.  He denies urinary frequency, dysuria, hematuria, urgency, and the sensation of incomplete emptying of the bladder.  He denies nocturia.  The patient is taking Flomax on occasion.  The patient has no GI symptoms.  The patient reports right leg discomfort which he attributes to recent trauma from jumping from a ladder.  Patient states that pain at the site is resolving.  He denies other sites of bone discomfort     Doing well he has no nocturia no rectal irritation does not desire refills PSA is pending I will see him back in a year.    Returns today he is doing great gets up 0 times at night and has no bright red blood per rectum he has the stable Peyronie's disease PSA is pending he reports no lower urinary tract symptomatology, particularly irritative symptoms such as frequency, urgency, dysuria, and obstructive symptomatology, particularly dribbling, hesitancy, and intermittency.       Past Medical History:     Past Medical History:   Diagnosis Date    Benign prostatic  hyperplasia without lower urinary tract symptoms 3/25/2021    Diabetes mellitus     Prostate cancer 3/25/2021       Current Meds:     Current Outpatient Medications   Medication Sig Dispense Refill    BISOPROLOL FUMARATE PO Take  by mouth Daily.      metFORMIN (GLUCOPHAGE) 500 MG tablet 1 tablet.      phenazopyridine (PYRIDIUM) 100 MG tablet Take 1 tablet by mouth 3 (Three) Times a Day As Needed for Bladder Spasms. 90 tablet 1    tadalafil (Cialis) 5 MG tablet Take 1 tablet by mouth Daily As Needed for Erectile Dysfunction. Take one Daily 30 tablet 6    tamsulosin (FLOMAX) 0.4 MG capsule 24 hr capsule Take 1 capsule by mouth Daily. 30 capsule 6     No current facility-administered medications for this visit.        Allergies:      No Known Allergies     Past Surgical History:     Past Surgical History:   Procedure Laterality Date    PROSTATE SURGERY         Social History:     Social History     Socioeconomic History    Marital status:    Tobacco Use    Smoking status: Never    Smokeless tobacco: Current     Types: Chew   Vaping Use    Vaping status: Never Used   Substance and Sexual Activity    Alcohol use: Never    Drug use: Never    Sexual activity: Defer       Family History:     Family History   Problem Relation Age of Onset    No Known Problems Father     Diabetes Mother        Review of Systems:     Review of Systems   Constitutional: Negative.    HENT: Negative.     Eyes: Negative.    Respiratory: Negative.     Cardiovascular: Negative.    Gastrointestinal: Negative.    Endocrine: Negative.    Musculoskeletal: Negative.    Allergic/Immunologic: Negative.    Neurological: Negative.    Hematological: Negative.    Psychiatric/Behavioral: Negative.         Physical Exam:     Physical Exam  Vitals and nursing note reviewed.   Constitutional:       Appearance: He is well-developed.   HENT:      Head: Normocephalic and atraumatic.   Eyes:      Conjunctiva/sclera: Conjunctivae normal.      Pupils: Pupils are  equal, round, and reactive to light.   Cardiovascular:      Rate and Rhythm: Normal rate and regular rhythm.      Heart sounds: Normal heart sounds.   Pulmonary:      Effort: Pulmonary effort is normal.      Breath sounds: Normal breath sounds.   Abdominal:      General: Bowel sounds are normal.      Palpations: Abdomen is soft.   Musculoskeletal:         General: Normal range of motion.      Cervical back: Normal range of motion.   Skin:     General: Skin is warm and dry.   Neurological:      Mental Status: He is alert and oriented to person, place, and time.      Deep Tendon Reflexes: Reflexes are normal and symmetric.   Psychiatric:         Behavior: Behavior normal.         Thought Content: Thought content normal.         Judgment: Judgment normal.         I have reviewed the following portions of the patient's history: Allergies, current medications, past family history, past medical history, past social history, past surgical history, problem list, and ROS and confirm it is accurate.    Recent Image (CT and/or KUB):      CT Abdomen and Pelvis: No results found for this or any previous visit.       CT Stone Protocol: No results found for this or any previous visit.       KUB: No results found for this or any previous visit.       Labs (past 3 months):      No visits with results within 3 Month(s) from this visit.   Latest known visit with results is:   Office Visit on 01/11/2024   Component Date Value Ref Range Status    PSA 01/11/2024 0.196  0.000 - 4.000 ng/mL Final        Procedure:       Assessment/Plan:   Prostate cancer:  He returns today status post biopsy for extensive discussion of prostate cancer.  We discussed staging and grading of the disease.  I described with a [place] system being from a 2 to10 scale with the most common low-grade pattern being a [place] 6.  I discussed the staging workup including a total body bone scan and CT scan especially with a PSA greater than 10.  We discussed the  various options at length. I discussed a radical retropubic prostatectomy done in the traditional fashion and using the robotic technique.  I then discussed radiation treatment both seed therapy and external beam therapy using Gold fiduciary markers.  We talked about some of the other alternatives such as a cryosurgical ablation at high intensity focused ultrasound as being viable alternatives but not recommended at this time.  He focused on aggressive watchful waiting and explained that currently low literature it's been discovered that a lot of men can actually observe it especially with numerous other comorbidities cannot have problems from the cancer by itself.  Talked about hormonal ablation and the side effects of creation of insulin resistance.  Overall, the patient was given appropriate literature, is going to think about it, and I'm going to revisit the topic with them after the next office visit.  Remains in remission              This document has been electronically signed by NANDA PEREZ MD January 14, 2025 07:54 EST    Dictated Utilizing Dragon Dictation: Part of this note may be an electronic transcription/translation of spoken language to printed text using the Dragon Dictation System.